# Patient Record
Sex: FEMALE | Race: WHITE | Employment: UNEMPLOYED | URBAN - NONMETROPOLITAN AREA
[De-identification: names, ages, dates, MRNs, and addresses within clinical notes are randomized per-mention and may not be internally consistent; named-entity substitution may affect disease eponyms.]

---

## 2019-02-22 ENCOUNTER — HOSPITAL ENCOUNTER (EMERGENCY)
Age: 24
Discharge: HOME | End: 2019-02-22
Attending: EMERGENCY MEDICINE
Payer: MEDICARE

## 2019-02-22 VITALS
HEIGHT: 69 IN | WEIGHT: 232 LBS | TEMPERATURE: 98.3 F | SYSTOLIC BLOOD PRESSURE: 148 MMHG | HEART RATE: 95 BPM | RESPIRATION RATE: 16 BRPM | DIASTOLIC BLOOD PRESSURE: 98 MMHG | BODY MASS INDEX: 34.36 KG/M2 | OXYGEN SATURATION: 99 %

## 2019-02-22 DIAGNOSIS — M79.604 RIGHT LEG PAIN: Primary | ICD-10-CM

## 2019-02-22 PROCEDURE — 99282 EMERGENCY DEPT VISIT SF MDM: CPT

## 2019-02-22 PROCEDURE — 96372 THER/PROPH/DIAG INJ SC/IM: CPT

## 2019-02-22 PROCEDURE — 6360000002 HC RX W HCPCS: Performed by: EMERGENCY MEDICINE

## 2019-02-22 RX ORDER — ORPHENADRINE CITRATE 30 MG/ML
60 INJECTION INTRAMUSCULAR; INTRAVENOUS ONCE
Status: COMPLETED | OUTPATIENT
Start: 2019-02-22 | End: 2019-02-22

## 2019-02-22 RX ORDER — CYCLOBENZAPRINE HCL 10 MG
10 TABLET ORAL 3 TIMES DAILY PRN
Qty: 15 TABLET | Refills: 0 | Status: SHIPPED | OUTPATIENT
Start: 2019-02-22 | End: 2019-02-27

## 2019-02-22 RX ORDER — METHYLPREDNISOLONE 4 MG/1
TABLET ORAL
Qty: 1 KIT | Refills: 0 | Status: SHIPPED | OUTPATIENT
Start: 2019-02-22 | End: 2019-02-28

## 2019-02-22 RX ORDER — KETOROLAC TROMETHAMINE 30 MG/ML
30 INJECTION, SOLUTION INTRAMUSCULAR; INTRAVENOUS ONCE
Status: COMPLETED | OUTPATIENT
Start: 2019-02-22 | End: 2019-02-22

## 2019-02-22 RX ORDER — TRAZODONE HYDROCHLORIDE 150 MG/1
150 TABLET ORAL NIGHTLY
COMMUNITY
End: 2019-06-11

## 2019-02-22 RX ORDER — HYDROXYZINE PAMOATE 25 MG/1
50 CAPSULE ORAL 3 TIMES DAILY PRN
COMMUNITY
End: 2019-05-24 | Stop reason: SDUPTHER

## 2019-02-22 RX ORDER — CARBAMAZEPINE 200 MG/1
200 TABLET ORAL DAILY
COMMUNITY
End: 2020-12-21

## 2019-02-22 RX ADMIN — KETOROLAC TROMETHAMINE 30 MG: 30 INJECTION, SOLUTION INTRAMUSCULAR at 21:27

## 2019-02-22 RX ADMIN — ORPHENADRINE CITRATE 60 MG: 30 INJECTION INTRAMUSCULAR; INTRAVENOUS at 21:27

## 2019-02-22 ASSESSMENT — PAIN DESCRIPTION - LOCATION: LOCATION: LEG

## 2019-02-22 ASSESSMENT — PAIN DESCRIPTION - ORIENTATION
ORIENTATION: RIGHT
ORIENTATION: RIGHT

## 2019-02-22 ASSESSMENT — PAIN DESCRIPTION - DESCRIPTORS: DESCRIPTORS: SHARP

## 2019-02-22 ASSESSMENT — PAIN SCALES - GENERAL
PAINLEVEL_OUTOF10: 3
PAINLEVEL_OUTOF10: 7

## 2019-02-22 ASSESSMENT — PAIN DESCRIPTION - PAIN TYPE
TYPE: ACUTE PAIN
TYPE: ACUTE PAIN

## 2019-02-23 ASSESSMENT — ENCOUNTER SYMPTOMS
VOMITING: 0
NAUSEA: 0
SHORTNESS OF BREATH: 0
BACK PAIN: 1

## 2019-02-25 ENCOUNTER — TELEPHONE (OUTPATIENT)
Dept: INTERNAL MEDICINE | Age: 24
End: 2019-02-25

## 2019-03-06 ENCOUNTER — HOSPITAL ENCOUNTER (EMERGENCY)
Age: 24
Discharge: LEFT BEFORE DISCHARGE | End: 2019-03-06
Payer: MEDICARE

## 2019-03-06 VITALS
HEART RATE: 103 BPM | HEIGHT: 69 IN | TEMPERATURE: 98.2 F | BODY MASS INDEX: 34.07 KG/M2 | OXYGEN SATURATION: 100 % | DIASTOLIC BLOOD PRESSURE: 76 MMHG | WEIGHT: 230 LBS | RESPIRATION RATE: 16 BRPM | SYSTOLIC BLOOD PRESSURE: 138 MMHG

## 2019-03-06 PROCEDURE — 4500000002 HC ER NO CHARGE

## 2019-03-15 ENCOUNTER — HOSPITAL ENCOUNTER (EMERGENCY)
Age: 24
Discharge: HOME | End: 2019-03-15
Attending: EMERGENCY MEDICINE
Payer: MEDICARE

## 2019-03-15 ENCOUNTER — APPOINTMENT (OUTPATIENT)
Dept: GENERAL RADIOLOGY | Age: 24
End: 2019-03-15
Payer: MEDICARE

## 2019-03-15 VITALS
TEMPERATURE: 99.9 F | RESPIRATION RATE: 16 BRPM | DIASTOLIC BLOOD PRESSURE: 91 MMHG | HEART RATE: 102 BPM | OXYGEN SATURATION: 96 % | SYSTOLIC BLOOD PRESSURE: 141 MMHG

## 2019-03-15 DIAGNOSIS — J40 BRONCHITIS: Primary | ICD-10-CM

## 2019-03-15 LAB
DIRECT EXAM: NORMAL
Lab: NORMAL
SPECIMEN DESCRIPTION: NORMAL

## 2019-03-15 PROCEDURE — 87804 INFLUENZA ASSAY W/OPTIC: CPT

## 2019-03-15 PROCEDURE — 71046 X-RAY EXAM CHEST 2 VIEWS: CPT

## 2019-03-15 PROCEDURE — 99283 EMERGENCY DEPT VISIT LOW MDM: CPT

## 2019-03-15 RX ORDER — BENZONATATE 200 MG/1
200 CAPSULE ORAL 3 TIMES DAILY PRN
Qty: 30 CAPSULE | Refills: 0 | Status: SHIPPED | OUTPATIENT
Start: 2019-03-15 | End: 2019-03-22

## 2019-03-15 RX ORDER — AZITHROMYCIN 250 MG/1
TABLET, FILM COATED ORAL
Qty: 1 PACKET | Refills: 0 | Status: SHIPPED | OUTPATIENT
Start: 2019-03-15 | End: 2019-03-15

## 2019-03-15 RX ORDER — IBUPROFEN 800 MG/1
800 TABLET ORAL EVERY 8 HOURS PRN
Qty: 30 TABLET | Refills: 0 | Status: SHIPPED | OUTPATIENT
Start: 2019-03-15 | End: 2019-05-12

## 2019-03-15 ASSESSMENT — ENCOUNTER SYMPTOMS
NAUSEA: 0
VOMITING: 0
COUGH: 1
EYE PAIN: 0
DIARRHEA: 0
BLOOD IN STOOL: 0
ABDOMINAL PAIN: 0
BACK PAIN: 0
SHORTNESS OF BREATH: 0
CONSTIPATION: 0

## 2019-03-18 ENCOUNTER — OFFICE VISIT (OUTPATIENT)
Dept: INTERNAL MEDICINE | Age: 24
End: 2019-03-18
Payer: MEDICARE

## 2019-03-18 VITALS
DIASTOLIC BLOOD PRESSURE: 80 MMHG | BODY MASS INDEX: 35.96 KG/M2 | HEIGHT: 69 IN | WEIGHT: 242.8 LBS | HEART RATE: 82 BPM | SYSTOLIC BLOOD PRESSURE: 130 MMHG | RESPIRATION RATE: 16 BRPM

## 2019-03-18 DIAGNOSIS — J40 BRONCHITIS: ICD-10-CM

## 2019-03-18 DIAGNOSIS — F60.3 BORDERLINE PERSONALITY DISORDER (HCC): ICD-10-CM

## 2019-03-18 DIAGNOSIS — R05.9 COUGH: ICD-10-CM

## 2019-03-18 DIAGNOSIS — F31.9 BIPOLAR DISEASE, CHRONIC (HCC): ICD-10-CM

## 2019-03-18 DIAGNOSIS — F41.0 PANIC DISORDER: Primary | ICD-10-CM

## 2019-03-18 DIAGNOSIS — F17.210 CIGARETTE SMOKER: ICD-10-CM

## 2019-03-18 DIAGNOSIS — F43.10 PTSD (POST-TRAUMATIC STRESS DISORDER): ICD-10-CM

## 2019-03-18 DIAGNOSIS — E66.9 CLASS 2 OBESITY WITHOUT SERIOUS COMORBIDITY WITH BODY MASS INDEX (BMI) OF 35.0 TO 35.9 IN ADULT, UNSPECIFIED OBESITY TYPE: ICD-10-CM

## 2019-03-18 DIAGNOSIS — Z72.0 TOBACCO ABUSE: ICD-10-CM

## 2019-03-18 PROBLEM — E66.812 CLASS 2 OBESITY WITHOUT SERIOUS COMORBIDITY WITH BODY MASS INDEX (BMI) OF 35.0 TO 35.9 IN ADULT: Status: ACTIVE | Noted: 2019-03-18

## 2019-03-18 PROCEDURE — G8417 CALC BMI ABV UP PARAM F/U: HCPCS | Performed by: INTERNAL MEDICINE

## 2019-03-18 PROCEDURE — 99204 OFFICE O/P NEW MOD 45 MIN: CPT | Performed by: INTERNAL MEDICINE

## 2019-03-18 PROCEDURE — 4004F PT TOBACCO SCREEN RCVD TLK: CPT | Performed by: INTERNAL MEDICINE

## 2019-03-18 PROCEDURE — G8484 FLU IMMUNIZE NO ADMIN: HCPCS | Performed by: INTERNAL MEDICINE

## 2019-03-18 PROCEDURE — G8427 DOCREV CUR MEDS BY ELIG CLIN: HCPCS | Performed by: INTERNAL MEDICINE

## 2019-03-18 RX ORDER — FLUOXETINE HYDROCHLORIDE 20 MG/1
CAPSULE ORAL
Refills: 0 | COMMUNITY
Start: 2019-03-05 | End: 2019-03-18 | Stop reason: CLARIF

## 2019-03-18 RX ORDER — FLUOXETINE HYDROCHLORIDE 20 MG/1
CAPSULE ORAL
COMMUNITY
End: 2020-12-21 | Stop reason: ALTCHOICE

## 2019-03-18 RX ORDER — FLUOXETINE 10 MG/1
30 CAPSULE ORAL
COMMUNITY
End: 2020-12-21

## 2019-03-18 RX ORDER — BUSPIRONE HYDROCHLORIDE 7.5 MG/1
TABLET ORAL
Refills: 0 | COMMUNITY
Start: 2019-03-05 | End: 2020-12-21

## 2019-03-18 RX ORDER — FLUOXETINE 10 MG/1
CAPSULE ORAL
Refills: 0 | COMMUNITY
Start: 2019-03-05 | End: 2019-03-18 | Stop reason: CLARIF

## 2019-03-18 RX ORDER — ALBUTEROL SULFATE 90 UG/1
1-2 AEROSOL, METERED RESPIRATORY (INHALATION) EVERY 6 HOURS PRN
Qty: 1 INHALER | Refills: 5 | Status: SHIPPED | OUTPATIENT
Start: 2019-03-18 | End: 2019-06-11

## 2019-05-12 ENCOUNTER — HOSPITAL ENCOUNTER (EMERGENCY)
Age: 24
Discharge: HOME | End: 2019-05-12
Attending: EMERGENCY MEDICINE
Payer: MEDICARE

## 2019-05-12 VITALS
TEMPERATURE: 98.4 F | OXYGEN SATURATION: 100 % | DIASTOLIC BLOOD PRESSURE: 88 MMHG | RESPIRATION RATE: 16 BRPM | SYSTOLIC BLOOD PRESSURE: 129 MMHG | HEART RATE: 97 BPM

## 2019-05-12 DIAGNOSIS — K08.89 PAIN, DENTAL: Primary | ICD-10-CM

## 2019-05-12 PROCEDURE — 99282 EMERGENCY DEPT VISIT SF MDM: CPT

## 2019-05-12 PROCEDURE — 6370000000 HC RX 637 (ALT 250 FOR IP): Performed by: EMERGENCY MEDICINE

## 2019-05-12 RX ORDER — PENICILLIN V POTASSIUM 250 MG/1
500 TABLET ORAL ONCE
Status: COMPLETED | OUTPATIENT
Start: 2019-05-12 | End: 2019-05-12

## 2019-05-12 RX ORDER — IBUPROFEN 800 MG/1
800 TABLET ORAL EVERY 8 HOURS PRN
Qty: 30 TABLET | Refills: 0 | Status: SHIPPED | OUTPATIENT
Start: 2019-05-12 | End: 2019-06-07

## 2019-05-12 RX ORDER — IBUPROFEN 800 MG/1
800 TABLET ORAL ONCE
Status: COMPLETED | OUTPATIENT
Start: 2019-05-12 | End: 2019-05-12

## 2019-05-12 RX ORDER — PENICILLIN V POTASSIUM 500 MG/1
500 TABLET ORAL 4 TIMES DAILY
Qty: 28 TABLET | Refills: 0 | Status: SHIPPED | OUTPATIENT
Start: 2019-05-12 | End: 2019-05-19

## 2019-05-12 RX ADMIN — PENICILLIN V POTASIUM 500 MG: 250 TABLET ORAL at 23:07

## 2019-05-12 RX ADMIN — IBUPROFEN 800 MG: 800 TABLET ORAL at 23:06

## 2019-05-12 ASSESSMENT — ENCOUNTER SYMPTOMS
ABDOMINAL PAIN: 0
RHINORRHEA: 0
VOMITING: 0
BACK PAIN: 0
EYE PAIN: 0
SHORTNESS OF BREATH: 0
COUGH: 0
NAUSEA: 0
DIARRHEA: 0
SORE THROAT: 0

## 2019-05-12 ASSESSMENT — PAIN DESCRIPTION - PROGRESSION: CLINICAL_PROGRESSION: GRADUALLY WORSENING

## 2019-05-12 ASSESSMENT — PAIN DESCRIPTION - PAIN TYPE: TYPE: ACUTE PAIN

## 2019-05-12 ASSESSMENT — PAIN DESCRIPTION - FREQUENCY: FREQUENCY: CONTINUOUS

## 2019-05-12 ASSESSMENT — PAIN DESCRIPTION - LOCATION: LOCATION: TEETH

## 2019-05-12 ASSESSMENT — PAIN DESCRIPTION - DESCRIPTORS: DESCRIPTORS: ACHING;SHARP;STABBING

## 2019-05-12 ASSESSMENT — PAIN SCALES - GENERAL: PAINLEVEL_OUTOF10: 8

## 2019-05-12 ASSESSMENT — PAIN DESCRIPTION - ONSET: ONSET: ON-GOING

## 2019-05-12 ASSESSMENT — PAIN DESCRIPTION - ORIENTATION: ORIENTATION: RIGHT;LOWER;POSTERIOR

## 2019-05-13 NOTE — ED PROVIDER NOTES
[facility] ED  [facility]  Phone: [phone]    eMERGENCY dEPARTMENT eNCOUnter          Pt Name: Isabella Guaman  MRN: [id number]  [place] 1995  Date of evaluation: 5/12/2019      CHIEF COMPLAINT       Chief Complaint   Patient presents with    Dental Pain     onset \"Fri night\"       HISTORY OF PRESENT ILLNESS              Jennifer Sanchez is a 21 y.o. female who presents with right lower canine dental pain. States she had or other T3 sling extracted however they were unable to finish anesthetizing the right lower teeth that need to be extracted and they were unable to extract teeth. She has an appointment to see the dentist again to try and extract the rest of the teeth. Denies any facial edema or erythema. No fevers or trouble swallowing. Denies other symptoms or concerns. REVIEW OF SYSTEMS         Review of Systems   Constitutional: Negative for chills, fatigue and fever. HENT: Positive for dental problem. Negative for congestion, rhinorrhea and sore throat. Eyes: Negative for pain. Respiratory: Negative for cough and shortness of breath. Cardiovascular: Negative for chest pain. Gastrointestinal: Negative for abdominal pain, diarrhea, nausea and vomiting. Genitourinary: Negative for difficulty urinating. Musculoskeletal: Negative for back pain and neck pain. Skin: Negative for rash. Neurological: Negative for weakness and headaches. All other systems reviewed and are negative. PAST MEDICAL HISTORY    has a past medical history of Anxiety, Bipolar disease, chronic ([address] Borderline personality disorder ([address] Bronchitis, Depression, Gallbladder disease, History of marijuana use, History of methamphetamine use, Panic disorder, PTSD (post-traumatic stress disorder), and Tobacco abuse. SURGICAL HISTORY      has a past surgical history that includes ERCP (2014) and Cholecystectomy, laparoscopic (2014).     CURRENT MEDICATIONS       Previous Medications    ALBUTEROL SULFATE HFA (VENTOLIN HFA) 108 (90 BASE) MCG/ACT INHALER    Inhale 1-2 puffs into the lungs every 6 hours as needed for Shortness of Breath    BUSPIRONE (BUSPAR) 7.5 MG TABLET    take 1 tablet by mouth three times a day for anxiety    CARBAMAZEPINE (TEGRETOL) 200 MG TABLET    Take 200 mg by mouth 2 times daily    FLUOXETINE (PROZAC) 10 MG CAPSULE    Take 1 capsule with 20 mg capsule daily for total dose of 30 mg daily    FLUOXETINE (PROZAC) 20 MG CAPSULE    Take 1 capsule with 10 mg capsule daily for total dose of 30 mg daily    HYDROXYZINE (VISTARIL) 25 MG CAPSULE    Take 50 mg by mouth 3 times daily as needed for Anxiety     TRAZODONE (DESYREL) 150 MG TABLET    Take 150 mg by mouth nightly        ALLERGIES     is allergic to codeine and tape [adhesive tape]. FAMILY HISTORY     indicated that her mother is alive. She indicated that her father is alive. She indicated that her brother is alive. She indicated that her maternal grandmother is alive. She indicated that her maternal grandfather is alive. She indicated that her paternal grandmother is alive. She indicated that her paternal grandfather is alive. family history includes Asthma in her brother; Heart Attack in her maternal grandfather; Heart Disease in her maternal grandfather; Mental Illness in her maternal grandmother and mother; Other in her brother; Seizures in her mother. SOCIAL HISTORY      reports that she has been smoking cigarettes. She has a 1.50 pack-year smoking history. She has never used smokeless tobacco. She reports that she has current or past drug history. Drugs: Marijuana and Methamphetamines. She reports that she does not drink alcohol. PHYSICAL EXAM     INITIAL VITALS:  tympanic temperature is 98.4 °F (36.9 °C). Her blood pressure is 129/88 and her pulse is 97. Her respiration is 16 and oxygen saturation is 100%.         Physical Exam   Constitutional: She appears by mouth every 8 hours as needed for Pain     Dispense:  30 tablet     Refill:  0    ibuprofen (ADVIL;MOTRIN) tablet 800 mg        Vitals:    Vitals:    05/12/19 2242   BP: 129/88   Pulse: 97   Resp: 16   Temp: 98.4 °F (36.9 °C)   TempSrc: Tympanic   SpO2: 100%     -------------------------  BP: 129/88, Temp: 98.4 °F (36.9 °C), Pulse: 97, Resp: 16      CONSULTS:    None    CRITICAL CARE:     None    PROCEDURES:    None    FINAL IMPRESSION      1.  Pain, dental          DISPOSITION/PLAN   DISPOSITION Decision To Discharge 05/12/2019 11:01:10 PM      Condition on Disposition    Improved    PATIENT REFERRED TO:  Brittanie Carcamo DO  [address] [facility]  [phone]    Schedule an appointment as soon as possible for a visit in 3 days        DISCHARGE MEDICATIONS:  New Prescriptions    IBUPROFEN (ADVIL;MOTRIN) 800 MG TABLET    Take 1 tablet by mouth every 8 hours as needed for Pain    PENICILLIN V POTASSIUM (VEETID) 500 MG TABLET    Take 1 tablet by mouth 4 times daily for 7 days       (Please note that portions of this note were completed with a voice recognition program.  Efforts were made to edit the dictations but occasionally words are mis-transcribed.)    Hillary Hernandez DO  Attending Emergency Physician        Hillary Hernandez DO  05/12/19 0892

## 2019-05-24 ENCOUNTER — TELEPHONE (OUTPATIENT)
Dept: INTERNAL MEDICINE | Age: 24
End: 2019-05-24

## 2019-05-24 ENCOUNTER — OFFICE VISIT (OUTPATIENT)
Dept: INTERNAL MEDICINE | Age: 24
End: 2019-05-24
Payer: MEDICARE

## 2019-05-24 VITALS
HEART RATE: 78 BPM | DIASTOLIC BLOOD PRESSURE: 68 MMHG | SYSTOLIC BLOOD PRESSURE: 106 MMHG | BODY MASS INDEX: 34.36 KG/M2 | WEIGHT: 232 LBS | HEIGHT: 69 IN

## 2019-05-24 DIAGNOSIS — F41.0 PANIC DISORDER: Primary | ICD-10-CM

## 2019-05-24 DIAGNOSIS — F43.10 PTSD (POST-TRAUMATIC STRESS DISORDER): ICD-10-CM

## 2019-05-24 PROCEDURE — 99213 OFFICE O/P EST LOW 20 MIN: CPT | Performed by: INTERNAL MEDICINE

## 2019-05-24 PROCEDURE — G8427 DOCREV CUR MEDS BY ELIG CLIN: HCPCS | Performed by: INTERNAL MEDICINE

## 2019-05-24 PROCEDURE — 4004F PT TOBACCO SCREEN RCVD TLK: CPT | Performed by: INTERNAL MEDICINE

## 2019-05-24 PROCEDURE — G8417 CALC BMI ABV UP PARAM F/U: HCPCS | Performed by: INTERNAL MEDICINE

## 2019-05-24 RX ORDER — ALPRAZOLAM 0.5 MG/1
0.5 TABLET ORAL 3 TIMES DAILY PRN
Qty: 45 TABLET | Refills: 0 | Status: SHIPPED | OUTPATIENT
Start: 2019-05-24 | End: 2019-06-03 | Stop reason: DRUGHIGH

## 2019-05-24 RX ORDER — LORAZEPAM 0.5 MG/1
TABLET ORAL
Refills: 0 | COMMUNITY
Start: 2019-04-17 | End: 2019-06-11

## 2019-05-24 RX ORDER — HYDROXYZINE 50 MG/1
TABLET, FILM COATED ORAL
Refills: 0 | COMMUNITY
Start: 2019-04-17 | End: 2019-06-11

## 2019-05-24 NOTE — TELEPHONE ENCOUNTER
Patient called [facility] and they ckosed her case d/t no showing appointment. She has to get set back up with them. They are going to call her to set appointment and in the mean time she was wondering if you could prescribe the Alprazolam. Just until she see's them.

## 2019-05-24 NOTE — PROGRESS NOTES
DR. Chaves Mood - PROGRESS NOTE    CHIEF COMPLAINT/HISTORY OF CHIEF COMPLAINT: This 21 y.o.  female comes in today to see how her anxiety is doing since she has been on her new doses of her psychiatric medicines for a couple of months. She feels that the Vistaril is not working. She says it seems to make her anxiety worse. She also says that she has been having issues with her anxiety at work. When she gets anxious she needs to have a few minutes to calm down and they are not letting her do that. She feels that if she doesn't get a five minute break the anxiety will get worse to the point where she will pass out. It usually happens around the change of shift from first to second shift. She does have another appointment at [facility] coming up in the next couple of weeks. She says they have told her that they have done \"all that they can do\" from a medication standpoint to help her anxiety, short of using controlled substances, which is not something that they usually use. She was told that the Vistaril was \"a last resort. \"    Medications that she hasn't tried yet include: Paxil, Luvox    Medications that she has been on in the past have been the following: Xanax, Effexor, Klonopin, Seroquel, Ativan    She is beginning to potentially consider taking the Xanax again.       ALLERGIES/INTOLERANCES:   Allergies   Allergen Reactions    Codeine Anaphylaxis    Tape Janece Rahel Tape] Other (See Comments)     Paper tape causes blisters       MEDICATIONS:   Outpatient Medications Marked as Taking for the 5/24/19 encounter (Office Visit) with Wagner Osorio, DO   Medication Sig Dispense Refill    hydrOXYzine (ATARAX) 50 MG tablet take 1 tablet by mouth three times a day if needed for anxiety  0    LORazepam (ATIVAN) 0.5 MG tablet take 1 tablet by mouth twice a day if needed  0    ibuprofen (ADVIL;MOTRIN) 800 MG tablet Take 1 tablet by mouth every 8 hours as needed for Pain 30 tablet 0    busPIRone (BUSPAR) 7.5 MG tablet take 1 tablet by mouth three times a day for anxiety  0    FLUoxetine (PROZAC) 10 MG capsule Take 1 capsule with 20 mg capsule daily for total dose of 30 mg daily      FLUoxetine (PROZAC) 20 MG capsule Take 1 capsule with 10 mg capsule daily for total dose of 30 mg daily      albuterol sulfate HFA (VENTOLIN HFA) 108 (90 Base) MCG/ACT inhaler Inhale 1-2 puffs into the lungs every 6 hours as needed for Shortness of Breath 1 Inhaler 5    carBAMazepine (TEGRETOL) 200 MG tablet Take 200 mg by mouth 2 times daily      traZODone (DESYREL) 150 MG tablet Take 150 mg by mouth nightly       hydrOXYzine (VISTARIL) 25 MG capsule Take 50 mg by mouth 3 times daily as needed for Anxiety          IMMUNIZATIONS: Reviewed for influenza and pneumococcal status as indicated in electronic record. REVIEW OF SYSTEMS:     Please see history of chief complaint above; otherwise no new problems with respect to General, HEENT, Cardiovascular, Respiratory, Gastrointestinal, Genitourinary, Endocrinologic, Musculoskeletal, or Neuropsychiatric complaints. PHYSICAL EXAMINATION:    Wt Readings from Last 2 Encounters:   05/24/19 232 lb (105.2 kg)   03/18/19 242 lb 12.8 oz (110.1 kg)       Vitals:    05/24/19 1110   BP: 106/68   Site: Left Upper Arm   Position: Sitting   Cuff Size: Large Adult   Pulse: 78   Weight: 232 lb (105.2 kg)   Height: 5' 9.02\" (1.753 m)     Body mass index is 34.24 kg/m². General: This is a 21 y.o.  female who is alert and oriented to person, place and time. She appears to be her stated age and does not appear to be in any acute distress. Skin: Skin color, texture, turgor normal. No rashes or lesions. HEENT/Neck: Head: Normal, normocephalic, atraumatic. Eye: Normal external eye, conjunctiva, lids cornea, NINA. Ears: Normal TM's bilaterally. Normal auditory canals and external ears. Non-tender. Nose: Normal external nose, mucus membranes and septum.   Pharynx: Dental Hygiene adequate. Normal buccal mucosa. Normal pharynx. Neck / Thyroid: Supple, no masses, nodes, nodules or enlargement. Lungs: Normal - CTA without rales, rhonchi, or wheezing. Heart: regular rate and rhythm, S1, S2 normal, no murmur, click, rub or gallop No S3 or S4. Abdomen: Obese, soft, non-distended, non-tender, normal active bowel sounds, no masses palpated and no hepatosplenomegaly  Extremities: There is no clubbing, cyanosis, or edema in any of the extremities. Neurologic:  cranial nerves II-XII are grossly intact  Osteopathic Structural Examination: Patient was examined in the seated and standing positions. There was full range of motion in the cervical, thoracic, and lumbar spines. No scoliosis. No change in thoracic kyphosis or lumbar lordosis. No increased paravertebral muscle tenderness. ASSESSMENT/PLAN:    1. Panic disorder, stable  2. PTSD (post-traumatic stress disorder), stable  - She will be seeing her providers at [facility] again in a couple of weeks, and will discuss stopping the Vistaril at that time. - We will consider prescribing Xanax for her if the providers at [facility] do not. No orders of the defined types were placed in this encounter. Requested Prescriptions      No prescriptions requested or ordered in this encounter       She seems to be doing fairly well with regards to her chronic health problems so we are not going to make any changes to her medications. Return in about 1 month (around 6/24/2019).         Electronically signed by Jamila Lilly DO on 5/24/2019 at 10:20 PM  Internal Medicine

## 2019-06-01 ENCOUNTER — HOSPITAL ENCOUNTER (EMERGENCY)
Age: 24
Discharge: HOME | End: 2019-06-01
Attending: EMERGENCY MEDICINE
Payer: MEDICARE

## 2019-06-01 VITALS
RESPIRATION RATE: 22 BRPM | OXYGEN SATURATION: 98 % | DIASTOLIC BLOOD PRESSURE: 99 MMHG | HEART RATE: 109 BPM | BODY MASS INDEX: 32.58 KG/M2 | TEMPERATURE: 100.9 F | SYSTOLIC BLOOD PRESSURE: 151 MMHG | HEIGHT: 69 IN | WEIGHT: 220 LBS

## 2019-06-01 DIAGNOSIS — F41.9 ANXIETY: Primary | ICD-10-CM

## 2019-06-01 LAB
ABSOLUTE EOS #: 0.1 K/UL (ref 0–0.4)
ABSOLUTE IMMATURE GRANULOCYTE: NORMAL K/UL (ref 0–0.3)
ABSOLUTE LYMPH #: 2.1 K/UL (ref 1–4.8)
ABSOLUTE MONO #: 0.6 K/UL (ref 0.1–1.2)
ANION GAP SERPL CALCULATED.3IONS-SCNC: 15 MMOL/L (ref 9–17)
BASOPHILS # BLD: 0 % (ref 0–1)
BASOPHILS ABSOLUTE: 0 K/UL (ref 0–0.2)
BUN BLDV-MCNC: 14 MG/DL (ref 6–20)
BUN/CREAT BLD: 24 (ref 9–20)
CALCIUM SERPL-MCNC: 9.7 MG/DL (ref 8.6–10.4)
CHLORIDE BLD-SCNC: 100 MMOL/L (ref 98–107)
CO2: 22 MMOL/L (ref 20–31)
CREAT SERPL-MCNC: 0.59 MG/DL (ref 0.5–0.9)
D DIMER: <100 NG/ML
DIFFERENTIAL TYPE: NORMAL
EOSINOPHILS RELATIVE PERCENT: 1 % (ref 1–7)
GFR AFRICAN AMERICAN: >60 ML/MIN
GFR NON-AFRICAN AMERICAN: >60 ML/MIN
GFR SERPL CREATININE-BSD FRML MDRD: ABNORMAL ML/MIN/{1.73_M2}
GFR SERPL CREATININE-BSD FRML MDRD: ABNORMAL ML/MIN/{1.73_M2}
GLUCOSE BLD-MCNC: 130 MG/DL (ref 70–99)
HCG QUALITATIVE: NEGATIVE
HCT VFR BLD CALC: 38 % (ref 36–46)
HEMOGLOBIN: 12.8 G/DL (ref 12–16)
IMMATURE GRANULOCYTES: NORMAL %
LYMPHOCYTES # BLD: 22 % (ref 16–46)
MCH RBC QN AUTO: 31.2 PG (ref 26–34)
MCHC RBC AUTO-ENTMCNC: 33.8 G/DL (ref 31–37)
MCV RBC AUTO: 92.3 FL (ref 80–100)
MONOCYTES # BLD: 7 % (ref 4–11)
NRBC AUTOMATED: NORMAL PER 100 WBC
PDW BLD-RTO: 13.2 % (ref 11–14.5)
PLATELET # BLD: 302 K/UL (ref 140–450)
PLATELET ESTIMATE: NORMAL
PMV BLD AUTO: 9.3 FL (ref 6–12)
POTASSIUM SERPL-SCNC: 3.4 MMOL/L (ref 3.7–5.3)
RBC # BLD: 4.12 M/UL (ref 4–5.2)
RBC # BLD: NORMAL 10*6/UL
SEG NEUTROPHILS: 70 % (ref 43–77)
SEGMENTED NEUTROPHILS ABSOLUTE COUNT: 6.5 K/UL (ref 1.8–7.7)
SODIUM BLD-SCNC: 137 MMOL/L (ref 135–144)
WBC # BLD: 9.4 K/UL (ref 3.5–11)
WBC # BLD: NORMAL 10*3/UL

## 2019-06-01 PROCEDURE — 85025 COMPLETE CBC W/AUTO DIFF WBC: CPT

## 2019-06-01 PROCEDURE — 85379 FIBRIN DEGRADATION QUANT: CPT

## 2019-06-01 PROCEDURE — 96374 THER/PROPH/DIAG INJ IV PUSH: CPT

## 2019-06-01 PROCEDURE — 93005 ELECTROCARDIOGRAM TRACING: CPT | Performed by: EMERGENCY MEDICINE

## 2019-06-01 PROCEDURE — 80048 BASIC METABOLIC PNL TOTAL CA: CPT

## 2019-06-01 PROCEDURE — 99283 EMERGENCY DEPT VISIT LOW MDM: CPT

## 2019-06-01 PROCEDURE — 2580000003 HC RX 258: Performed by: EMERGENCY MEDICINE

## 2019-06-01 PROCEDURE — 6360000002 HC RX W HCPCS: Performed by: EMERGENCY MEDICINE

## 2019-06-01 PROCEDURE — 84703 CHORIONIC GONADOTROPIN ASSAY: CPT

## 2019-06-01 RX ORDER — 0.9 % SODIUM CHLORIDE 0.9 %
1000 INTRAVENOUS SOLUTION INTRAVENOUS ONCE
Status: COMPLETED | OUTPATIENT
Start: 2019-06-01 | End: 2019-06-01

## 2019-06-01 RX ORDER — LORAZEPAM 2 MG/ML
0.5 INJECTION INTRAMUSCULAR ONCE
Status: COMPLETED | OUTPATIENT
Start: 2019-06-01 | End: 2019-06-01

## 2019-06-01 RX ADMIN — SODIUM CHLORIDE 1000 ML: 9 INJECTION, SOLUTION INTRAVENOUS at 15:52

## 2019-06-01 RX ADMIN — LORAZEPAM 0.5 MG: 2 INJECTION INTRAMUSCULAR; INTRAVENOUS at 15:53

## 2019-06-01 ASSESSMENT — PAIN SCALES - GENERAL: PAINLEVEL_OUTOF10: 5

## 2019-06-01 NOTE — ED PROVIDER NOTES
[facility] ED  [address] [facility]  Phone: [phone]  eMERGENCY dEPARTMENT eNCOUnter      Pt Name: Erik Randall  MRN: [id number]  [place] 1995  Date of evaluation: 6/1/2019    CHIEF COMPLAINT       Chief Complaint   Patient presents with    Panic Attack       HISTORY OF PRESENT ILLNESS    Jennifer Driscoll is a 21 y.o. female who presents to the emergency department with anxiety. She is feeling short of breath with chest pain that started earlier today. Typical she is able to walk away from situations but she is under tremendous amount of stress currently and cannot seem to get her symptoms under control today. Her anxiety has increased markedly since she stopped using drugs and alcohol back in February. She's been using his Xanax with minimal relief had a dose earlier today. Denies any other symptoms with the exception of shortness of breath. She rates her pain 5/10 and nothing really makes it better or worse. REVIEW OF SYSTEMS       Constitutional: No fevers or chills   HEENT: No sore throat, rhinorrhea, or earache   Eyes: No blurry vision or double vision no drainage   Cardiovascular: Positive chest pain and tachycardia  Respiratory: No wheezing positive shortness of breath  Gastrointestinal: No nausea, vomiting, diarrhea, constipation, or abdominal pain   : No hematuria or dysuria   Musculoskeletal: No swelling or pain   Skin: No rash   Neurological: No focal neurologic complaints, paresthesias, weakness, or headache     PAST MEDICAL HISTORY    has a past medical history of Anxiety, Bipolar disease, chronic ([address] Borderline personality disorder ([address] Bronchitis, Depression, Gallbladder disease, History of marijuana use, History of methamphetamine use, Panic disorder, PTSD (post-traumatic stress disorder), and Tobacco abuse. SURGICAL HISTORY      has a past surgical history that includes ERCP (2014) and Cholecystectomy, laparoscopic (2014).     CURRENT MEDICATIONS       Previous Medications    ALBUTEROL SULFATE HFA (VENTOLIN HFA) 108 (90 BASE) MCG/ACT INHALER    Inhale 1-2 puffs into the lungs every 6 hours as needed for Shortness of Breath    ALPRAZOLAM (XANAX) 0.5 MG TABLET    Take 1 tablet by mouth 3 times daily as needed for Anxiety for up to 15 days. BUSPIRONE (BUSPAR) 7.5 MG TABLET    take 1 tablet by mouth three times a day for anxiety    CARBAMAZEPINE (TEGRETOL) 200 MG TABLET    Take 200 mg by mouth daily     FLUOXETINE (PROZAC) 10 MG CAPSULE    30 mg Take 1 capsule with 20 mg capsule daily for total dose of 30 mg daily     FLUOXETINE (PROZAC) 20 MG CAPSULE    Take 1 capsule with 10 mg capsule daily for total dose of 30 mg daily    HYDROXYZINE (ATARAX) 50 MG TABLET    take 1 tablet by mouth three times a day if needed for anxiety    IBUPROFEN (ADVIL;MOTRIN) 800 MG TABLET    Take 1 tablet by mouth every 8 hours as needed for Pain    LORAZEPAM (ATIVAN) 0.5 MG TABLET    take 1 tablet by mouth twice a day if needed    TRAZODONE (DESYREL) 150 MG TABLET    Take 150 mg by mouth nightly        ALLERGIES     is allergic to codeine and tape [adhesive tape]. FAMILY HISTORY     indicated that her mother is alive. She indicated that her father is alive. She indicated that her brother is alive. She indicated that her maternal grandmother is alive. She indicated that her maternal grandfather is alive. She indicated that her paternal grandmother is alive. She indicated that her paternal grandfather is alive. family history includes Asthma in her brother; Heart Attack in her maternal grandfather; Heart Disease in her maternal grandfather; Mental Illness in her maternal grandmother and mother; Other in her brother; Seizures in her mother. SOCIAL HISTORY      reports that she has been smoking cigarettes. She has a 1.50 pack-year smoking history. She has never used smokeless tobacco. She reports that she has current or past drug history. Drug: Marijuana.  She reports that she does not drink alcohol. PHYSICAL EXAM       ED Triage Vitals [06/01/19 1529]   BP Temp Temp Source Pulse Resp SpO2 Height Weight   (!) 151/99 100.9 °F (38.3 °C) Tympanic 109 22 98 % 5' 9\" (1.753 m) 220 lb (99.8 kg)       Constitutional: Alert, oriented x3, nontoxic, answering questions appropriately, acting properly for age, in no acute distress afebrile rocking back and forth and anxious  HEENT: Extraocular muscles intact, mucus membranes moist,  Pupils equal, round, reactive to light,   Neck: Trachea midline   Cardiovascular: Regular rhythm and tachycardic no S3, S4, or murmurs   Respiratory: Clear to auscultation bilaterally no wheezes, rhonchi, rales, no respiratory distress no tachypnea no retractions no hypoxia  Gastrointestinal: Soft, nontender, nondistended, positive bowel sounds. No rebound, rigidity, or guarding. Musculoskeletal: No extremity pain or swelling tenderness  Neurologic: Moving all 4 extremities without difficulty there are no gross focal neurologic deficits   Skin: Warm and dry     Physical Exam  DIFFERENTIAL DIAGNOSIS/ MDM:     IV, labs. EKG. Ativan. DIAGNOSTIC RESULTS     EKG: All EKG's are interpreted by theEmergency Department Physician who either signs or Co-signs this chart in the absence of a cardiologist.    1525 sinus tachycardia rate 109 TX QRS 92  no acute ST-T wave changes.     Not indicated unless otherwise documented above    LABS:  Results for orders placed or performed during the hospital encounter of 06/01/19   CBC Auto Differential   Result Value Ref Range    WBC 9.4 3.5 - 11.0 k/uL    RBC 4.12 4.0 - 5.2 m/uL    Hemoglobin 12.8 12.0 - 16.0 g/dL    Hematocrit 38.0 36 - 46 %    MCV 92.3 80 - 100 fL    MCH 31.2 26 - 34 pg    MCHC 33.8 31 - 37 g/dL    RDW 13.2 11.0 - 14.5 %    Platelets 340 300 - 052 k/uL    MPV 9.3 6.0 - 12.0 fL    NRBC Automated NOT REPORTED per 100 WBC    Differential Type NOT REPORTED     Immature Granulocytes NOT REPORTED 0 % Absolute Immature Granulocyte NOT REPORTED 0.00 - 0.30 k/uL    WBC Morphology NOT REPORTED     RBC Morphology NOT REPORTED     Platelet Estimate NOT REPORTED     Seg Neutrophils 70 43 - 77 %    Lymphocytes 22 16 - 46 %    Monocytes 7 4 - 11 %    Eosinophils % 1 1 - 7 %    Basophils 0 0 - 1 %    Segs Absolute 6.50 1.8 - 7.7 k/uL    Absolute Lymph # 2.10 1.0 - 4.8 k/uL    Absolute Mono # 0.60 0.1 - 1.2 k/uL    Absolute Eos # 0.10 0.0 - 0.4 k/uL    Basophils # 0.00 0.0 - 0.2 k/uL   Basic Metabolic Panel   Result Value Ref Range    Glucose 130 (H) 70 - 99 mg/dL    BUN 14 6 - 20 mg/dL    CREATININE 0.59 0.50 - 0.90 mg/dL    Bun/Cre Ratio 24 (H) 9 - 20    Calcium 9.7 8.6 - 10.4 mg/dL    Sodium 137 135 - 144 mmol/L    Potassium 3.4 (L) 3.7 - 5.3 mmol/L    Chloride 100 98 - 107 mmol/L    CO2 22 20 - 31 mmol/L    Anion Gap 15 9 - 17 mmol/L    GFR Non-African American >60 >60 mL/min    GFR African American >60 >60 mL/min    GFR Comment          GFR Staging NOT REPORTED    HCG Qualitative, Serum   Result Value Ref Range    hCG Qual NEGATIVE NEGATIVE   D-Dimer, Rapid   Result Value Ref Range    D-Dimer, Quant <100 <400 ng/mL   EKG 12 Lead   Result Value Ref Range    Ventricular Rate 109 BPM    Atrial Rate 109 BPM    P-R Interval 138 ms    QRS Duration 92 ms    Q-T Interval 358 ms    QTc Calculation (Bazett) 482 ms    P Axis 47 degrees    R Axis 62 degrees    T Axis 49 degrees       Not indicated unless otherwise documented above    RADIOLOGY:   I reviewed the radiologist interpretations:    No orders to display       Not indicated unless otherwise documented above    EMERGENCY DEPARTMENT COURSE:     The patient was given the following medications:  Orders Placed This Encounter   Medications    LORazepam (ATIVAN) injection 0.5 mg    0.9 % sodium chloride bolus        Vitals:   -------------------------  BP (!) 151/99   Pulse 109   Temp 100.9 °F (38.3 °C) (Tympanic)   Resp 22   Ht 5' 9\" (1.753 m)   Wt 220 lb (99.8 kg) LMP 05/27/2019   SpO2 98%   BMI 32.49 kg/m²     4:15 PM awaiting d-dimer other labs unremarkable EKG also negative with the exception of tachycardia. She did present febrile no obvious source of her fever. Lungs clear. No upper respiratory symptoms. Chest heaviness resolved after IV Ativan. She does have by mouth Ativan at home. 4:45 PM no acute distress feeling much better. D-dimer negative. Will discharge. Anxiety reaction. I have reviewed the disposition diagnosis with the patient and or their family/guardian. I have answered their questions and given discharge instructions. They voiced understanding of these instructions and did not have any furtherquestions or complaints. CRITICAL CARE:    None    CONSULTS:    None    PROCEDURES:    None      OARRS Report if indicated             FINAL IMPRESSION      1.  Anxiety          DISPOSITION/PLAN   DISPOSITION Decision To Discharge 06/01/2019 04:45:45 PM        PATIENT REFERRED TO:  Sari Arvizu DO  [address] [place] [address] [address] Cumminsn  [phone]    Schedule an appointment as soon as possible for a visit in 2 days        DISCHARGE MEDICATIONS:  New Prescriptions    No medications on file       (Please note that portions of thisnote were completed with a voice recognition program.  Efforts were made to edit the dictations but occasionally words are mis-transcribed.)    Angel DO  Attending Emergency Physician        Yecenia Leach, [address] [place] [address]  06/01/19 3400

## 2019-06-02 LAB
EKG ATRIAL RATE: 109 BPM
EKG P AXIS: 47 DEGREES
EKG P-R INTERVAL: 138 MS
EKG Q-T INTERVAL: 358 MS
EKG QRS DURATION: 92 MS
EKG QTC CALCULATION (BAZETT): 482 MS
EKG R AXIS: 62 DEGREES
EKG T AXIS: 49 DEGREES
EKG VENTRICULAR RATE: 109 BPM

## 2019-06-03 ENCOUNTER — OFFICE VISIT (OUTPATIENT)
Dept: INTERNAL MEDICINE | Age: 24
End: 2019-06-03
Payer: MEDICARE

## 2019-06-03 VITALS
RESPIRATION RATE: 16 BRPM | HEART RATE: 80 BPM | DIASTOLIC BLOOD PRESSURE: 80 MMHG | SYSTOLIC BLOOD PRESSURE: 130 MMHG | HEIGHT: 69 IN | BODY MASS INDEX: 33.77 KG/M2 | WEIGHT: 228 LBS

## 2019-06-03 DIAGNOSIS — F43.10 PTSD (POST-TRAUMATIC STRESS DISORDER): ICD-10-CM

## 2019-06-03 DIAGNOSIS — F41.0 PANIC DISORDER: Primary | ICD-10-CM

## 2019-06-03 PROCEDURE — G8417 CALC BMI ABV UP PARAM F/U: HCPCS | Performed by: INTERNAL MEDICINE

## 2019-06-03 PROCEDURE — 4004F PT TOBACCO SCREEN RCVD TLK: CPT | Performed by: INTERNAL MEDICINE

## 2019-06-03 PROCEDURE — G8427 DOCREV CUR MEDS BY ELIG CLIN: HCPCS | Performed by: INTERNAL MEDICINE

## 2019-06-03 PROCEDURE — 99213 OFFICE O/P EST LOW 20 MIN: CPT | Performed by: INTERNAL MEDICINE

## 2019-06-03 RX ORDER — ALPRAZOLAM 1 MG/1
1 TABLET ORAL 3 TIMES DAILY PRN
Qty: 45 TABLET | Refills: 0 | Status: SHIPPED | OUTPATIENT
Start: 2019-06-03 | End: 2019-06-14 | Stop reason: SDUPTHER

## 2019-06-03 NOTE — PROGRESS NOTES
DR. Greene Channel - PROGRESS NOTE    CHIEF COMPLAINT/HISTORY OF CHIEF COMPLAINT: This 21 y.o.  female comes in today after having been seen in the emergency room at [facility] over the weekend for a panic attack. Her mother and sister have been away from home the last few days and so she has been at home by herself and it has been making her anxiety a lot worse. She has been using her Xanax a little more than prescribed because of it. She had a panic attack at work again on 6/1/19 and ended up going to the ER. They gave her some IV Ativan and it helped calm her down. She does have an upcoming appointment with [facility] next week but she is also wondering if her Xanax dose could be increased, because 0.5 mg works but not very well and she used to be on a much higher dose when she lived in [place]. There are no other complaints. ALLERGIES/INTOLERANCES:   Allergies   Allergen Reactions    Codeine Anaphylaxis    Tape Jimenez Bear Tape] Other (See Comments)     Paper tape causes blisters       MEDICATIONS:   Outpatient Medications Marked as Taking for the 6/3/19 encounter (Office Visit) with Mike Rosas, DO   Medication Sig Dispense Refill    ALPRAZolam (XANAX) 1 MG tablet Take 1 tablet by mouth 3 times daily as needed for Anxiety for up to 15 days.  45 tablet 0    hydrOXYzine (ATARAX) 50 MG tablet take 1 tablet by mouth three times a day if needed for anxiety  0    LORazepam (ATIVAN) 0.5 MG tablet take 1 tablet by mouth twice a day if needed  0    ibuprofen (ADVIL;MOTRIN) 800 MG tablet Take 1 tablet by mouth every 8 hours as needed for Pain 30 tablet 0    busPIRone (BUSPAR) 7.5 MG tablet take 1 tablet by mouth three times a day for anxiety  0    FLUoxetine (PROZAC) 10 MG capsule 30 mg Take 1 capsule with 20 mg capsule daily for total dose of 30 mg daily       FLUoxetine (PROZAC) 20 MG capsule Take 1 capsule with 10 mg capsule daily for total dose of 30 mg daily      albuterol sulfate HFA (VENTOLIN HFA) 108 (90 Base) MCG/ACT inhaler Inhale 1-2 puffs into the lungs every 6 hours as needed for Shortness of Breath 1 Inhaler 5    carBAMazepine (TEGRETOL) 200 MG tablet Take 200 mg by mouth daily       traZODone (DESYREL) 150 MG tablet Take 150 mg by mouth nightly          IMMUNIZATIONS: Reviewed for influenza and pneumococcal status as indicated in electronic record. REVIEW OF SYSTEMS:     Please see history of chief complaint above; otherwise no new problems with respect to General, HEENT, Cardiovascular, Respiratory, Gastrointestinal, Genitourinary, Endocrinologic, Musculoskeletal, or Neuropsychiatric complaints. PHYSICAL EXAMINATION:    Wt Readings from Last 2 Encounters:   06/03/19 228 lb (103.4 kg)   06/01/19 220 lb (99.8 kg)       Vitals:    06/03/19 1638   BP: 130/80   Site: Right Upper Arm   Position: Sitting   Cuff Size: Large Adult   Pulse: 80   Resp: 16   Weight: 228 lb (103.4 kg)   Height: 5' 9.02\" (1.753 m)     Body mass index is 33.65 kg/m². General: This is a 21 y.o.  female who is alert and oriented to person, place and time. She appears to be her stated age and does not appear to be in any acute distress. Skin: Skin color, texture, turgor normal. No rashes or lesions. HEENT/Neck: Head: Normal, normocephalic, atraumatic. Eye: Normal external eye, conjunctiva, lids cornea, NINA. Ears: Normal TM's bilaterally. Normal auditory canals and external ears. Non-tender. Nose: Normal external nose, mucus membranes and septum. Pharynx: Dental Hygiene adequate. Normal buccal mucosa. Normal pharynx. Neck / Thyroid: Supple, no masses, nodes, nodules or enlargement. Lungs: Normal - CTA without rales, rhonchi, or wheezing. Heart: regular rate and rhythm, S1, S2 normal, no murmur, click, rub or gallop No S3 or S4.   Abdomen: Obese, soft, non-distended, non-tender, normal active bowel sounds, no masses palpated and no hepatosplenomegaly  Extremities: There is no clubbing, cyanosis, or edema in any of the extremities. Neurologic:  cranial nerves II-XII are grossly intact    ASSESSMENT/PLAN:    1. Panic disorder, s/p panic attack treated in emergency room  2. PTSD (post-traumatic stress disorder)  - She does have an upcoming appointment at [facility] and will keep it as scheduled  - We will increase her Xanax to 1 mg three times a day as needed. - ALPRAZolam (XANAX) 1 MG tablet; Take 1 tablet by mouth 3 times daily as needed for Anxiety for up to 15 days. Dispense: 45 tablet; Refill: 0      No orders of the defined types were placed in this encounter. Requested Prescriptions     Signed Prescriptions Disp Refills    ALPRAZolam (XANAX) 1 MG tablet 45 tablet 0     Sig: Take 1 tablet by mouth 3 times daily as needed for Anxiety for up to 15 days. She will return here as previously scheduled or sooner if needed.         Electronically signed by Vandana Peguero DO on 6/3/2019 at 6:37 PM  Internal Medicine

## 2019-06-07 ENCOUNTER — APPOINTMENT (OUTPATIENT)
Dept: GENERAL RADIOLOGY | Age: 24
End: 2019-06-07
Payer: MEDICARE

## 2019-06-07 ENCOUNTER — HOSPITAL ENCOUNTER (EMERGENCY)
Age: 24
Discharge: HOME | End: 2019-06-07
Attending: EMERGENCY MEDICINE
Payer: MEDICARE

## 2019-06-07 VITALS
RESPIRATION RATE: 16 BRPM | SYSTOLIC BLOOD PRESSURE: 138 MMHG | HEIGHT: 69 IN | OXYGEN SATURATION: 97 % | BODY MASS INDEX: 32.58 KG/M2 | HEART RATE: 93 BPM | DIASTOLIC BLOOD PRESSURE: 81 MMHG | WEIGHT: 220 LBS | TEMPERATURE: 98.6 F

## 2019-06-07 DIAGNOSIS — S86.912A STRAIN OF LEFT KNEE, INITIAL ENCOUNTER: Primary | ICD-10-CM

## 2019-06-07 DIAGNOSIS — S93.402A SPRAIN OF LEFT ANKLE, UNSPECIFIED LIGAMENT, INITIAL ENCOUNTER: ICD-10-CM

## 2019-06-07 PROCEDURE — 73562 X-RAY EXAM OF KNEE 3: CPT

## 2019-06-07 PROCEDURE — 6360000002 HC RX W HCPCS

## 2019-06-07 PROCEDURE — 96372 THER/PROPH/DIAG INJ SC/IM: CPT

## 2019-06-07 PROCEDURE — 6370000000 HC RX 637 (ALT 250 FOR IP)

## 2019-06-07 PROCEDURE — 73590 X-RAY EXAM OF LOWER LEG: CPT

## 2019-06-07 PROCEDURE — 99283 EMERGENCY DEPT VISIT LOW MDM: CPT

## 2019-06-07 RX ORDER — KETOROLAC TROMETHAMINE 30 MG/ML
INJECTION, SOLUTION INTRAMUSCULAR; INTRAVENOUS
Status: COMPLETED
Start: 2019-06-07 | End: 2019-06-07

## 2019-06-07 RX ORDER — ACETAMINOPHEN 500 MG
TABLET ORAL
Status: COMPLETED
Start: 2019-06-07 | End: 2019-06-07

## 2019-06-07 RX ORDER — IBUPROFEN 600 MG/1
600 TABLET ORAL EVERY 6 HOURS PRN
Qty: 120 TABLET | Refills: 0 | Status: SHIPPED | OUTPATIENT
Start: 2019-06-07 | End: 2021-03-01 | Stop reason: ALTCHOICE

## 2019-06-07 RX ORDER — ACETAMINOPHEN 500 MG
1000 TABLET ORAL ONCE
Status: COMPLETED | OUTPATIENT
Start: 2019-06-07 | End: 2019-06-07

## 2019-06-07 RX ORDER — KETOROLAC TROMETHAMINE 30 MG/ML
30 INJECTION, SOLUTION INTRAMUSCULAR; INTRAVENOUS ONCE
Status: COMPLETED | OUTPATIENT
Start: 2019-06-07 | End: 2019-06-07

## 2019-06-07 RX ADMIN — ACETAMINOPHEN 1000 MG: 500 TABLET, FILM COATED ORAL at 23:32

## 2019-06-07 RX ADMIN — KETOROLAC TROMETHAMINE 30 MG: 30 INJECTION, SOLUTION INTRAMUSCULAR at 22:56

## 2019-06-07 RX ADMIN — KETOROLAC TROMETHAMINE 30 MG: 30 INJECTION, SOLUTION INTRAMUSCULAR; INTRAVENOUS at 22:56

## 2019-06-07 RX ADMIN — Medication 1000 MG: at 23:32

## 2019-06-07 ASSESSMENT — PAIN DESCRIPTION - LOCATION: LOCATION: KNEE

## 2019-06-07 ASSESSMENT — PAIN SCALES - GENERAL
PAINLEVEL_OUTOF10: 6
PAINLEVEL_OUTOF10: 8

## 2019-06-07 ASSESSMENT — PAIN DESCRIPTION - ORIENTATION: ORIENTATION: LEFT

## 2019-06-08 NOTE — ED PROVIDER NOTES
ATTENDING  ADDENDUM     Care of this patient was assumed from preceding physician. The patient was seen for Knee Pain (left)  . The patient's initial evaluation and plan have been discussed with the prior provider who initially evaluated the patient. Nursing Notes, Past Medical Hx, Past Surgical Hx, Social Hx, Allergies, and Family Hx were all reviewed. When the patient was signed out to me were waiting for the results of the patient's x-rays to come back. Her x-rays to come back negative for any acute fracture. Patient still having pain relievers and Toradol for that. She will be sent home on ibuprofen, an air splint and crutches and followed up with her doctor  DIFFERENTIAL DIAGNOSIS/ MDM:           Follow Exit Care instructions closely. I have reviewed the disposition diagnosis with the patient and or their family/guardian. I have answered their questions and given discharge instructions. They voiced understanding of these instructions and did not have any further questions or complaints. DIAGNOSTIC RESULTS     RADIOLOGY:   Non-plain film images such as CT, Ultrasound and MRI are read by the radiologist. Plain radiographic images are visualized and preliminarily interpreted by the emergency physician with the below findings:  XR KNEE LEFT (3 VIEWS)   Final Result   No acute osseous abnormality of the left tibia/fibula      No acute osseous abnormality of the left knee. XR TIBIA FIBULA LEFT (2 VIEWS)   Final Result   No acute osseous abnormality of the left tibia/fibula      No acute osseous abnormality of the left knee. LABS:  No results found for this visit on 06/07/19.     ABNORMAL LABS:  Labs Reviewed - No data to display     EKG:      EMERGENCY DEPARTMENT COURSE:   Vitals:    Vitals:    06/07/19 2150 06/07/19 2258   BP: (!) 140/79 138/81   Pulse: 111 93   Resp: 18 16   Temp: 98.6 °F (37 °C)    TempSrc: Tympanic    SpO2: 98% 97%   Weight: 220 lb (99.8 kg)    Height: 5' 9\" (1.753 m)      -------------------------  BP: 138/81, Temp: 98.6 °F (37 °C), Pulse: 93, Resp: 16          FINAL IMPRESSION      1. Strain of left knee, initial encounter    2. Sprain of left ankle, unspecified ligament, initial encounter          DISPOSITION/PLAN   DISPOSITION Decision To Discharge 06/07/2019 10:59:39 PM    I have reviewed the disposition diagnosis with the patient and or their family/guardian. I have answered their questions and given discharge instructions. They voiced understanding of these instructions and did not have any further questions or complaints. Reevaluation: To my independent examination patient has some tenderness at the left lateral aspect of her knee and also some tenderness of the calf to palpation. She definitely  has some lateral malleolus tenderness as well. I will give her some Toradol for this and believe this is all contusion-  the patient will be discharged home with ibuprofen and ice and ankle splint and crutches-she is due to follow-up with her own doctor in 2 days.       Condition on Disposition      Stable    PATIENT REFERRED TO:  Lennox Der, DO  [address]  [phone]    In 2 days        DISCHARGE MEDICATIONS:  New Prescriptions    IBUPROFEN (IBU) 600 MG TABLET    Take 1 tablet by mouth every 6 hours as needed for Pain       (Please note that portions of this note were completed with a voice recognition program.  Efforts were made to edit the dictations but occasionally words are mis-transcribed.)    Dowell MD  Attending Emergency Physician        Jeff Polo MD  06/07/19 5561

## 2019-06-08 NOTE — ED PROVIDER NOTES
(PROZAC) 20 MG CAPSULE    Take 1 capsule with 10 mg capsule daily for total dose of 30 mg daily    HYDROXYZINE (ATARAX) 50 MG TABLET    take 1 tablet by mouth three times a day if needed for anxiety    IBUPROFEN (ADVIL;MOTRIN) 800 MG TABLET    Take 1 tablet by mouth every 8 hours as needed for Pain    LORAZEPAM (ATIVAN) 0.5 MG TABLET    take 1 tablet by mouth twice a day if needed    TRAZODONE (DESYREL) 150 MG TABLET    Take 150 mg by mouth nightly        ALLERGIES     is allergic to codeine and tape [adhesive tape]. FAMILY HISTORY     indicated that her mother is alive. She indicated that her father is alive. She indicated that her brother is alive. She indicated that her maternal grandmother is alive. She indicated that her maternal grandfather is alive. She indicated that her paternal grandmother is alive. She indicated that her paternal grandfather is alive. family history includes Asthma in her brother; Heart Attack in her maternal grandfather; Heart Disease in her maternal grandfather; Mental Illness in her maternal grandmother and mother; Other in her brother; Seizures in her mother. SOCIAL HISTORY      reports that she has been smoking cigarettes. She has a 1.50 pack-year smoking history. She has never used smokeless tobacco. She reports that she has current or past drug history. Drug: Marijuana. She reports that she does not drink alcohol. PHYSICAL EXAM     INITIAL VITALS:  height is 5' 9\" (1.753 m) and weight is 220 lb (99.8 kg). Her tympanic temperature is 98.6 °F (37 °C). Her blood pressure is 140/79 (abnormal) and her pulse is 111. Her respiration is 18 and oxygen saturation is 98%. Physical Exam   Constitutional: She is oriented to person, place, and time. She appears well-developed and well-nourished. No distress. HENT:   Head: Normocephalic and atraumatic. Mouth/Throat: Oropharynx is clear and moist.   Eyes: Pupils are equal, round, and reactive to light.  Conjunctivae and EOM are normal.   Neck: Normal range of motion. Neck supple. No tracheal deviation present. Cardiovascular: Normal rate, regular rhythm and intact distal pulses. Pulmonary/Chest: Effort normal and breath sounds normal. No respiratory distress. Musculoskeletal: Normal range of motion. She exhibits no edema or tenderness. No swelling or deformity of either lower extremity. She is tender at the proximal fibular head as well as throughout her entire left knee. No joint effusion. No distal deficits. Neurological: She is alert and oriented to person, place, and time. Skin: Skin is warm and dry. Psychiatric: She has a normal mood and affect. Her behavior is normal. Judgment and thought content normal.   Vitals reviewed. MDM:   Ice has been applied. Patient took ibuprofen prior to arrival.  I've ordered an x-ray of her left knee and left leg. It is now 2230 hrs. and I have endorsed this patient to Dr. Jackie Sena. Please see his addendum. The patient was given the following medications:  No orders of the defined types were placed in this encounter.          (Please note that portions of this note werecompleted with a voice recognition program.  Efforts were made to edit the dictations but occasionally words are mis-transcribed.)    Lucero James MD, F.A.C.E.P, F.A.A.E.M  Emergency Physician Attending          Lucero James MD  06/07/19 1852

## 2019-06-10 ENCOUNTER — TELEPHONE (OUTPATIENT)
Dept: INTERNAL MEDICINE | Age: 24
End: 2019-06-10

## 2019-06-10 DIAGNOSIS — S86.912D KNEE STRAIN, LEFT, SUBSEQUENT ENCOUNTER: Primary | ICD-10-CM

## 2019-06-10 DIAGNOSIS — S96.912D ANKLE STRAIN, LEFT, SUBSEQUENT ENCOUNTER: ICD-10-CM

## 2019-06-10 NOTE — TELEPHONE ENCOUNTER
Patient was in ER on Friday night for her Left knee and ankle. They did xrays and they told her to call to see if you wanted more imaging on this. She stated that she has no control over this lower leg. She is in an air cast and on crutches.   Please advise

## 2019-06-11 ENCOUNTER — OFFICE VISIT (OUTPATIENT)
Dept: ORTHOPEDIC SURGERY | Age: 24
End: 2019-06-11
Payer: MEDICARE

## 2019-06-11 VITALS
WEIGHT: 228 LBS | BODY MASS INDEX: 33.77 KG/M2 | SYSTOLIC BLOOD PRESSURE: 122 MMHG | HEART RATE: 94 BPM | HEIGHT: 69 IN | DIASTOLIC BLOOD PRESSURE: 70 MMHG

## 2019-06-11 DIAGNOSIS — S83.92XA SPRAIN OF LEFT KNEE, UNSPECIFIED LIGAMENT, INITIAL ENCOUNTER: ICD-10-CM

## 2019-06-11 DIAGNOSIS — S93.402A SPRAIN OF LEFT ANKLE, UNSPECIFIED LIGAMENT, INITIAL ENCOUNTER: Primary | ICD-10-CM

## 2019-06-11 PROCEDURE — G8417 CALC BMI ABV UP PARAM F/U: HCPCS | Performed by: PHYSICIAN ASSISTANT

## 2019-06-11 PROCEDURE — 4004F PT TOBACCO SCREEN RCVD TLK: CPT | Performed by: PHYSICIAN ASSISTANT

## 2019-06-11 PROCEDURE — 99202 OFFICE O/P NEW SF 15 MIN: CPT | Performed by: PHYSICIAN ASSISTANT

## 2019-06-11 PROCEDURE — G8427 DOCREV CUR MEDS BY ELIG CLIN: HCPCS | Performed by: PHYSICIAN ASSISTANT

## 2019-06-11 RX ORDER — METHYLPREDNISOLONE 4 MG/1
TABLET ORAL
Qty: 1 KIT | Refills: 0 | Status: SHIPPED | OUTPATIENT
Start: 2019-06-11 | End: 2019-06-14 | Stop reason: ALTCHOICE

## 2019-06-12 NOTE — PROGRESS NOTES
[address]  [facility]  Dept: [phone]  Loc: [phone]    Date of Service:  6/11/2019    Sandhya Levy  YOB: 1995  MRN: [id number]      Chyna Farley is a 21 y.o. female who comes in today with increasing pain within this left knee and ankle. The patient stated that she was in her driveway and she ended up having a fall and landing awkwardly on her left knee and ankle. She went to the emergency room and x-rays negative for any acute fractures or bony abnormalities. The patient states all of her pain is located in the back of the calf and in the lateral aspect of the knee. She denies really any pain at all within the foot or ankle at this time. PHYSICAL EXAM:  This is a 21 y.o. female, alert and oriented x3, cooperative, in no acute distress. Sensation intact. Neurovascularly intact. She is able to flex and extend toes, flex and extend the ankle. She does have pain with stressing of the LCL. No pain with stressing of the MCL. She does have tenderness to palpation about the belly of the calf. RADIOLOGY: X-rays of left knee and ankle are negative for acute fractures or bony abnormalities. IMPRESSION:  Left knee and ankle sprain. PLAN:  We are going to get her a Medrol Dosepak. We will get her into physical therapy for left knee and ankle to see if this will help relieve this current pain that she is having at this time. If she is not getting any improvement and she is not able to tolerate coming off the crutches we will have her come back and see us and discuss further treatment options at that time. Elizabeth Martinez am personally transcribing for Kit Ambrocio PA-C 6/12/19 at 2:06 PM.    I, Kit Ambrocio PA-C, personally performed the services described in this document as transcribed by Jim Perera, and it is both accurate and complete.     Electronically signed by Kit Ambrocio EDDIE on 6/13/2019 at 7:28 AM

## 2019-06-14 ENCOUNTER — OFFICE VISIT (OUTPATIENT)
Dept: INTERNAL MEDICINE | Age: 24
End: 2019-06-14
Payer: MEDICARE

## 2019-06-14 VITALS
DIASTOLIC BLOOD PRESSURE: 80 MMHG | BODY MASS INDEX: 33.76 KG/M2 | HEIGHT: 69 IN | SYSTOLIC BLOOD PRESSURE: 120 MMHG | RESPIRATION RATE: 16 BRPM | WEIGHT: 227.96 LBS | HEART RATE: 78 BPM

## 2019-06-14 DIAGNOSIS — F43.10 PTSD (POST-TRAUMATIC STRESS DISORDER): ICD-10-CM

## 2019-06-14 DIAGNOSIS — S86.912D KNEE STRAIN, LEFT, SUBSEQUENT ENCOUNTER: ICD-10-CM

## 2019-06-14 DIAGNOSIS — S96.912D ANKLE STRAIN, LEFT, SUBSEQUENT ENCOUNTER: ICD-10-CM

## 2019-06-14 DIAGNOSIS — F41.0 PANIC DISORDER: Primary | ICD-10-CM

## 2019-06-14 PROCEDURE — 4004F PT TOBACCO SCREEN RCVD TLK: CPT | Performed by: INTERNAL MEDICINE

## 2019-06-14 PROCEDURE — G8427 DOCREV CUR MEDS BY ELIG CLIN: HCPCS | Performed by: INTERNAL MEDICINE

## 2019-06-14 PROCEDURE — G8417 CALC BMI ABV UP PARAM F/U: HCPCS | Performed by: INTERNAL MEDICINE

## 2019-06-14 PROCEDURE — 99213 OFFICE O/P EST LOW 20 MIN: CPT | Performed by: INTERNAL MEDICINE

## 2019-06-14 RX ORDER — ALPRAZOLAM 1 MG/1
1 TABLET ORAL 3 TIMES DAILY PRN
Qty: 45 TABLET | Refills: 0 | Status: SHIPPED | OUTPATIENT
Start: 2019-06-14 | End: 2019-07-01 | Stop reason: SDUPTHER

## 2019-06-14 NOTE — PROGRESS NOTES
daily          IMMUNIZATIONS: Reviewed for influenza and pneumococcal status as indicated in electronic record. REVIEW OF SYSTEMS:     Please see history of chief complaint above; otherwise no new problems with respect to General, HEENT, Cardiovascular, Respiratory, Gastrointestinal, Genitourinary, Endocrinologic, Musculoskeletal, or Neuropsychiatric complaints. PHYSICAL EXAMINATION:    Wt Readings from Last 2 Encounters:   06/14/19 227 lb 15.3 oz (103.4 kg)   06/11/19 228 lb (103.4 kg)       Vitals:    06/14/19 1447   BP: 120/80   Site: Right Upper Arm   Position: Sitting   Cuff Size: Medium Adult   Pulse: 78   Resp: 16   Weight: 227 lb 15.3 oz (103.4 kg)   Height: 5' 9.02\" (1.753 m)     Body mass index is 33.65 kg/m². General: This is a 21 y.o.  female who is alert and oriented to person, place and time. She appears to be her stated age and does not appear to be in any acute distress. Skin: Skin color, texture, turgor normal. No rashes or lesions. HEENT/Neck: Head: Normal, normocephalic, atraumatic. Eye: Normal external eye, conjunctiva, lids cornea, NINA. Ears: Normal TM's bilaterally. Normal auditory canals and external ears. Non-tender. Nose: Normal external nose, mucus membranes and septum. Pharynx: Dental Hygiene adequate. Normal buccal mucosa. Normal pharynx. Neck / Thyroid: Supple, no masses, nodes, nodules or enlargement. Lungs: Normal - CTA without rales, rhonchi, or wheezing. Heart: regular rate and rhythm, S1, S2 normal, no murmur, click, rub or gallop No S3 or S4. Abdomen: Obese, soft, non-distended, non-tender, normal active bowel sounds, no masses palpated and no hepatosplenomegaly  Extremities: There is no clubbing, cyanosis, or edema in any of the extremities. Left ankle is swollen and tender. Neurologic:  cranial nerves II-XII are grossly intact    ASSESSMENT/PLAN:    1. Panic disorder, improved  2.  PTSD (post-traumatic stress disorder)  - The Xanax is helping her  - We will refill the Xanax and we will see how she does with it while working on finding a less stressful job  - ALPRAZolam (XANAX) 1 MG tablet; Take 1 tablet by mouth 3 times daily as needed for Anxiety for up to 15 days. Dispense: 45 tablet; Refill: 0    3. Ankle strain, left, subsequent encounter  4. Knee strain, left, subsequent encounter  - We discussed physical therapy. She really ought to give it a try before deciding it's not going to work for her  - She will go down and set up another session. No orders of the defined types were placed in this encounter. Requested Prescriptions      No prescriptions requested or ordered in this encounter       Medications as ordered above. Return in about 1 month (around 7/14/2019).         Electronically signed by Anthony Hendricks DO on 6/14/2019 at 3:30 PM  Internal Medicine

## 2019-07-01 DIAGNOSIS — F41.0 PANIC DISORDER: ICD-10-CM

## 2019-07-01 DIAGNOSIS — F43.10 PTSD (POST-TRAUMATIC STRESS DISORDER): ICD-10-CM

## 2019-07-01 RX ORDER — ALPRAZOLAM 1 MG/1
1 TABLET ORAL 3 TIMES DAILY PRN
Qty: 45 TABLET | Refills: 0 | Status: SHIPPED | OUTPATIENT
Start: 2019-07-01 | End: 2019-07-12 | Stop reason: SDUPTHER

## 2019-07-12 ENCOUNTER — OFFICE VISIT (OUTPATIENT)
Dept: INTERNAL MEDICINE | Age: 24
End: 2019-07-12
Payer: MEDICARE

## 2019-07-12 VITALS
HEART RATE: 80 BPM | DIASTOLIC BLOOD PRESSURE: 80 MMHG | RESPIRATION RATE: 16 BRPM | HEIGHT: 69 IN | WEIGHT: 222.4 LBS | SYSTOLIC BLOOD PRESSURE: 126 MMHG | BODY MASS INDEX: 32.94 KG/M2

## 2019-07-12 DIAGNOSIS — F41.0 PANIC DISORDER: Primary | ICD-10-CM

## 2019-07-12 DIAGNOSIS — F31.9 BIPOLAR DISEASE, CHRONIC (HCC): ICD-10-CM

## 2019-07-12 DIAGNOSIS — S86.912D KNEE STRAIN, LEFT, SUBSEQUENT ENCOUNTER: ICD-10-CM

## 2019-07-12 DIAGNOSIS — S96.912D ANKLE STRAIN, LEFT, SUBSEQUENT ENCOUNTER: ICD-10-CM

## 2019-07-12 DIAGNOSIS — F43.10 PTSD (POST-TRAUMATIC STRESS DISORDER): ICD-10-CM

## 2019-07-12 PROCEDURE — G8417 CALC BMI ABV UP PARAM F/U: HCPCS | Performed by: INTERNAL MEDICINE

## 2019-07-12 PROCEDURE — 99213 OFFICE O/P EST LOW 20 MIN: CPT | Performed by: INTERNAL MEDICINE

## 2019-07-12 PROCEDURE — G8427 DOCREV CUR MEDS BY ELIG CLIN: HCPCS | Performed by: INTERNAL MEDICINE

## 2019-07-12 PROCEDURE — 4004F PT TOBACCO SCREEN RCVD TLK: CPT | Performed by: INTERNAL MEDICINE

## 2019-07-12 RX ORDER — ALPRAZOLAM 1 MG/1
1 TABLET ORAL 3 TIMES DAILY PRN
Qty: 90 TABLET | Refills: 0 | Status: SHIPPED | OUTPATIENT
Start: 2019-07-12 | End: 2019-08-08 | Stop reason: SDUPTHER

## 2019-07-12 NOTE — PROGRESS NOTES
Endocrinologic, Musculoskeletal, or Neuropsychiatric complaints. PHYSICAL EXAMINATION:    Wt Readings from Last 2 Encounters:   07/12/19 222 lb 6.4 oz (100.9 kg)   06/14/19 227 lb 15.3 oz (103.4 kg)       Vitals:    07/12/19 1447   BP: 126/80   Site: Right Upper Arm   Position: Sitting   Cuff Size: Medium Adult   Pulse: 80   Resp: 16   Weight: 222 lb 6.4 oz (100.9 kg)   Height: 5' 9.02\" (1.753 m)     Body mass index is 32.83 kg/m². General: This is a 25 y.o.  female who is alert and oriented to person, place and time. She appears to be her stated age and does not appear to be in any acute distress. Less anxious than previous visits. Skin: Skin color, texture, turgor normal. No rashes or lesions. HEENT/Neck: Head: Normal, normocephalic, atraumatic. Eye: Normal external eye, conjunctiva, lids cornea, NINA. Ears: Normal TM's bilaterally. Normal auditory canals and external ears. Non-tender. Nose: Normal external nose, mucus membranes and septum. Pharynx: Dental Hygiene adequate. Normal buccal mucosa. Normal pharynx. Neck / Thyroid: Supple, no masses, nodes, nodules or enlargement. Lungs: Normal - CTA without rales, rhonchi, or wheezing. Heart: regular rate and rhythm, S1, S2 normal, no murmur, click, rub or gallop No S3 or S4. Abdomen: Obese, soft, non-distended, non-tender, normal active bowel sounds, no masses palpated and no hepatosplenomegaly  Extremities: There is no clubbing, cyanosis, or edema in any of the extremities. Neurologic:  cranial nerves II-XII are grossly intact  Osteopathic Structural Examination: Patient was examined in the seated and standing positions. There was full range of motion in the cervical, thoracic, and lumbar spines. No scoliosis. No change in thoracic kyphosis or lumbar lordosis. No increased paravertebral muscle tenderness. ASSESSMENT/PLAN:    1. Panic disorder, stable  2.  PTSD (post-traumatic stress disorder), stable  - She will continue the Xanax at the

## 2019-08-08 ENCOUNTER — APPOINTMENT (OUTPATIENT)
Dept: GENERAL RADIOLOGY | Age: 24
End: 2019-08-08
Payer: MEDICARE

## 2019-08-08 ENCOUNTER — HOSPITAL ENCOUNTER (EMERGENCY)
Age: 24
Discharge: HOME | End: 2019-08-08
Attending: EMERGENCY MEDICINE
Payer: MEDICARE

## 2019-08-08 VITALS
DIASTOLIC BLOOD PRESSURE: 92 MMHG | RESPIRATION RATE: 12 BRPM | SYSTOLIC BLOOD PRESSURE: 137 MMHG | HEART RATE: 97 BPM | OXYGEN SATURATION: 98 % | WEIGHT: 217 LBS | BODY MASS INDEX: 32.03 KG/M2

## 2019-08-08 DIAGNOSIS — F41.0 PANIC DISORDER: ICD-10-CM

## 2019-08-08 DIAGNOSIS — S90.32XA CONTUSION OF LEFT FOOT, INITIAL ENCOUNTER: Primary | ICD-10-CM

## 2019-08-08 DIAGNOSIS — F43.10 PTSD (POST-TRAUMATIC STRESS DISORDER): ICD-10-CM

## 2019-08-08 PROCEDURE — 99283 EMERGENCY DEPT VISIT LOW MDM: CPT

## 2019-08-08 PROCEDURE — 6360000002 HC RX W HCPCS: Performed by: EMERGENCY MEDICINE

## 2019-08-08 PROCEDURE — 73630 X-RAY EXAM OF FOOT: CPT

## 2019-08-08 PROCEDURE — 96372 THER/PROPH/DIAG INJ SC/IM: CPT

## 2019-08-08 RX ORDER — ALPRAZOLAM 1 MG/1
1 TABLET ORAL 3 TIMES DAILY PRN
Qty: 90 TABLET | Refills: 0 | Status: SHIPPED | OUTPATIENT
Start: 2019-08-08 | End: 2019-09-04 | Stop reason: SDUPTHER

## 2019-08-08 RX ORDER — KETOROLAC TROMETHAMINE 30 MG/ML
30 INJECTION, SOLUTION INTRAMUSCULAR; INTRAVENOUS ONCE
Status: COMPLETED | OUTPATIENT
Start: 2019-08-08 | End: 2019-08-08

## 2019-08-08 RX ADMIN — KETOROLAC TROMETHAMINE 30 MG: 30 INJECTION, SOLUTION INTRAMUSCULAR at 19:12

## 2019-08-08 ASSESSMENT — PAIN DESCRIPTION - LOCATION: LOCATION: FOOT

## 2019-08-08 ASSESSMENT — PAIN DESCRIPTION - PROGRESSION: CLINICAL_PROGRESSION: NOT CHANGED

## 2019-08-08 ASSESSMENT — PAIN SCALES - GENERAL
PAINLEVEL_OUTOF10: 7
PAINLEVEL_OUTOF10: 7
PAINLEVEL_OUTOF10: 4

## 2019-08-08 ASSESSMENT — PAIN DESCRIPTION - FREQUENCY: FREQUENCY: CONTINUOUS

## 2019-08-08 ASSESSMENT — PAIN DESCRIPTION - ORIENTATION: ORIENTATION: LEFT

## 2019-08-08 ASSESSMENT — PAIN DESCRIPTION - PAIN TYPE: TYPE: ACUTE PAIN

## 2019-08-08 ASSESSMENT — ENCOUNTER SYMPTOMS: COLOR CHANGE: 0

## 2019-08-08 ASSESSMENT — PAIN DESCRIPTION - ONSET: ONSET: SUDDEN

## 2019-08-08 ASSESSMENT — PAIN DESCRIPTION - DESCRIPTORS: DESCRIPTORS: THROBBING

## 2019-08-08 ASSESSMENT — PAIN - FUNCTIONAL ASSESSMENT
PAIN_FUNCTIONAL_ASSESSMENT: 0-10
PAIN_FUNCTIONAL_ASSESSMENT: PREVENTS OR INTERFERES SOME ACTIVE ACTIVITIES AND ADLS

## 2019-09-04 DIAGNOSIS — F41.0 PANIC DISORDER: ICD-10-CM

## 2019-09-04 DIAGNOSIS — F43.10 PTSD (POST-TRAUMATIC STRESS DISORDER): ICD-10-CM

## 2019-09-04 RX ORDER — ALPRAZOLAM 1 MG/1
1 TABLET ORAL 3 TIMES DAILY PRN
Qty: 90 TABLET | Refills: 0 | Status: SHIPPED | OUTPATIENT
Start: 2019-09-04 | End: 2019-10-01 | Stop reason: SDUPTHER

## 2019-09-30 DIAGNOSIS — F43.10 PTSD (POST-TRAUMATIC STRESS DISORDER): ICD-10-CM

## 2019-09-30 DIAGNOSIS — F41.0 PANIC DISORDER: ICD-10-CM

## 2019-10-01 RX ORDER — ALPRAZOLAM 1 MG/1
1 TABLET ORAL 3 TIMES DAILY PRN
Qty: 90 TABLET | Refills: 0 | Status: SHIPPED | OUTPATIENT
Start: 2019-10-01 | End: 2019-10-29 | Stop reason: SDUPTHER

## 2019-10-29 DIAGNOSIS — F41.0 PANIC DISORDER: ICD-10-CM

## 2019-10-29 DIAGNOSIS — F43.10 PTSD (POST-TRAUMATIC STRESS DISORDER): ICD-10-CM

## 2019-11-01 RX ORDER — ALPRAZOLAM 1 MG/1
TABLET ORAL
Qty: 90 TABLET | Refills: 0 | Status: SHIPPED | OUTPATIENT
Start: 2019-11-01 | End: 2019-11-29 | Stop reason: SDUPTHER

## 2019-11-29 DIAGNOSIS — F41.0 PANIC DISORDER: ICD-10-CM

## 2019-11-29 DIAGNOSIS — F43.10 PTSD (POST-TRAUMATIC STRESS DISORDER): ICD-10-CM

## 2019-12-02 RX ORDER — ALPRAZOLAM 1 MG/1
TABLET ORAL
Qty: 90 TABLET | Refills: 0 | Status: SHIPPED | OUTPATIENT
Start: 2019-12-02 | End: 2020-01-03

## 2020-01-03 RX ORDER — ALPRAZOLAM 1 MG/1
TABLET ORAL
Qty: 90 TABLET | Refills: 0 | Status: SHIPPED | OUTPATIENT
Start: 2020-01-03 | End: 2020-02-03

## 2020-01-06 RX ORDER — ALPRAZOLAM 1 MG/1
TABLET ORAL
Qty: 90 TABLET | Refills: 0 | OUTPATIENT
Start: 2020-01-06 | End: 2020-02-05

## 2020-02-03 RX ORDER — ALPRAZOLAM 1 MG/1
TABLET ORAL
Qty: 90 TABLET | Refills: 0 | Status: SHIPPED | OUTPATIENT
Start: 2020-02-03 | End: 2020-03-03

## 2020-04-01 RX ORDER — ALPRAZOLAM 1 MG/1
TABLET ORAL
Qty: 90 TABLET | Refills: 0 | Status: SHIPPED | OUTPATIENT
Start: 2020-04-01 | End: 2020-05-04

## 2020-05-04 RX ORDER — ALPRAZOLAM 1 MG/1
TABLET ORAL
Qty: 90 TABLET | Refills: 0 | Status: SHIPPED | OUTPATIENT
Start: 2020-05-04 | End: 2020-05-28

## 2020-05-18 RX ORDER — ALBUTEROL SULFATE 90 UG/1
AEROSOL, METERED RESPIRATORY (INHALATION)
Qty: 18 G | Refills: 11 | Status: SHIPPED | OUTPATIENT
Start: 2020-05-18 | End: 2021-06-22

## 2020-05-28 RX ORDER — ALPRAZOLAM 1 MG/1
TABLET ORAL
Qty: 90 TABLET | Refills: 0 | Status: SHIPPED | OUTPATIENT
Start: 2020-05-28 | End: 2020-07-02

## 2020-07-31 NOTE — TELEPHONE ENCOUNTER
Pharmacy requesting a refill of the below medication which has been pended for you:     Requested Prescriptions     Pending Prescriptions Disp Refills    ALPRAZolam (XANAX) 1 MG tablet [Pharmacy Med Name: ALPRAZOLAM 1 MG TABLET] 90 tablet      Sig: take 1 tablet by mouth three times a day if needed       Last Appointment Date: 7/12/2019  Next Appointment Date: Visit date not found    Allergies   Allergen Reactions    Codeine Anaphylaxis    Tape Anthonette Karishma Tape] Other (See Comments)     Paper tape causes blisters

## 2020-08-03 RX ORDER — ALPRAZOLAM 1 MG/1
TABLET ORAL
Qty: 90 TABLET | Refills: 0 | Status: SHIPPED | OUTPATIENT
Start: 2020-08-03 | End: 2020-09-01

## 2020-08-03 NOTE — TELEPHONE ENCOUNTER
[place] today    Controlled Substance Monitoring:    Acute and Chronic Pain Monitoring:   RX Monitoring 8/3/2020   Attestation -   Periodic Controlled Substance Monitoring No signs of potential drug abuse or diversion identified.

## 2020-09-01 RX ORDER — ALPRAZOLAM 1 MG/1
TABLET ORAL
Qty: 90 TABLET | Refills: 0 | Status: SHIPPED | OUTPATIENT
Start: 2020-09-01 | End: 2020-09-28

## 2020-09-28 ENCOUNTER — VIRTUAL VISIT (OUTPATIENT)
Dept: INTERNAL MEDICINE | Age: 25
End: 2020-09-28
Payer: MEDICARE

## 2020-09-28 PROCEDURE — G8427 DOCREV CUR MEDS BY ELIG CLIN: HCPCS | Performed by: INTERNAL MEDICINE

## 2020-09-28 PROCEDURE — 99213 OFFICE O/P EST LOW 20 MIN: CPT | Performed by: INTERNAL MEDICINE

## 2020-09-28 RX ORDER — ALPRAZOLAM 1 MG/1
TABLET ORAL
Qty: 90 TABLET | Refills: 0 | Status: SHIPPED | OUTPATIENT
Start: 2020-09-28 | End: 2020-10-28

## 2020-10-28 RX ORDER — ALPRAZOLAM 1 MG/1
TABLET ORAL
Qty: 90 TABLET | Refills: 0 | Status: SHIPPED | OUTPATIENT
Start: 2020-10-28 | End: 2020-11-30

## 2020-10-29 NOTE — TELEPHONE ENCOUNTER
[place] checked today    Controlled Substance Monitoring:    Acute and Chronic Pain Monitoring:   RX Monitoring 10/28/2020   Attestation -   Periodic Controlled Substance Monitoring No signs of potential drug abuse or diversion identified.

## 2020-11-30 RX ORDER — ALPRAZOLAM 1 MG/1
TABLET ORAL
Qty: 90 TABLET | Refills: 0 | Status: SHIPPED | OUTPATIENT
Start: 2020-11-30 | End: 2020-12-28

## 2020-12-21 ENCOUNTER — VIRTUAL VISIT (OUTPATIENT)
Dept: INTERNAL MEDICINE | Age: 25
End: 2020-12-21
Payer: MEDICARE

## 2020-12-21 VITALS — BODY MASS INDEX: 37.03 KG/M2 | HEIGHT: 69 IN | WEIGHT: 250 LBS

## 2020-12-21 PROCEDURE — 99213 OFFICE O/P EST LOW 20 MIN: CPT | Performed by: INTERNAL MEDICINE

## 2020-12-21 PROCEDURE — G8427 DOCREV CUR MEDS BY ELIG CLIN: HCPCS | Performed by: INTERNAL MEDICINE

## 2020-12-21 PROCEDURE — 99211 OFF/OP EST MAY X REQ PHY/QHP: CPT

## 2020-12-21 RX ORDER — TIZANIDINE 4 MG/1
4 TABLET ORAL EVERY 8 HOURS PRN
Qty: 90 TABLET | Refills: 1 | Status: SHIPPED | OUTPATIENT
Start: 2020-12-21 | End: 2021-04-01 | Stop reason: SDUPTHER

## 2020-12-21 RX ORDER — NAPROXEN 500 MG/1
500 TABLET ORAL 2 TIMES DAILY PRN
Qty: 30 TABLET | Refills: 1 | Status: SHIPPED | OUTPATIENT
Start: 2020-12-21 | End: 2021-01-28 | Stop reason: SDUPTHER

## 2020-12-21 NOTE — PROGRESS NOTES
DR. Joseline Hannah - TELEHEALTH PROGRESS NOTE    CHIEF COMPLAINT/HISTORY OF CHIEF COMPLAINT: This 22 y.o.  female presents via TeleHealth visit today complaining of mid to lower back pain for the last 4 days. She picked up her child who was throwing a tantrum and then the child \"went limp\" in her arms and she felt immediate pain. The pain is mainly on the left side. Ibuprofen does help the pain, but only a little bit. Using a heating pad helps as well. The ibuprofen doesn't help as much as it did at first. She decided to see us for this issue. There are no other complaints. ALLERGIES/INTOLERANCES:   Allergies   Allergen Reactions    Codeine Anaphylaxis     Happened when in ER getting arm reset at age 9  States did have to be tubed    Tape Rosaura Carlton Tape] Other (See Comments)     Paper tape causes blisters       MEDICATIONS:   Outpatient Medications Marked as Taking for the 12/21/20 encounter (Virtual Visit) with Eileen Cabezas, DO   Medication Sig Dispense Refill    ALPRAZolam (XANAX) 1 MG tablet take 1 tablet by mouth three times a day if needed 90 tablet 0    albuterol sulfate  (90 Base) MCG/ACT inhaler inhale 1 to 2 puffs by mouth every 6 hours if needed 18 g 11    ibuprofen (IBU) 600 MG tablet Take 1 tablet by mouth every 6 hours as needed for Pain 120 tablet 0       IMMUNIZATIONS: Reviewed for influenza and pneumococcal status as indicated in electronic record. REVIEW OF SYSTEMS:     Please see history of chief complaint above; otherwise no new problems with respect to General, HEENT, Cardiovascular, Respiratory, Gastrointestinal, Genitourinary, Endocrinologic, Musculoskeletal, or Neuropsychiatric complaints. PHYSICAL EXAMINATION:    (Due to this being a TeleHealth encounter, evaluation of the following organ systems is limited: Vitals/General/Skin/HEENT/Lungs/Heart/Abdomen/Genitourinary/Musculoskeletal/Neurologic.     Wt Readings from Last 2 Encounters: 12/21/20 250 lb (113.4 kg)   08/08/19 217 lb (98.4 kg)       Patient-Reported Vitals 12/21/2020   Patient-Reported Weight 250 lb   Patient-Reported Height 5' 9\"   Patient-Reported Systolic 430   Patient-Reported Diastolic 84   Patient-Reported Pulse 90   Patient-Reported Temperature 98.0     Body mass index is 36.92 kg/m². General: This is a 22 y.o.  female who is alert and oriented to person, place and time. She appears to be her stated age and does not appear to be in any acute distress. She was able to follow commands. Affect appropriate for the situation. There were no hallucinations. Skin: Skin color, texture, turgor appears normal. No apparent rashes or lesions. HEENT/Neck: Head: Normal, normocephalic, atraumatic  Eye: Normal appearing external eye, conjunctiva, lids cornea. EOM appear intact bilaterally. Ears: Normal appearing external ears. Nose: Normal appearing external nose. No discharge. Pharynx: Mucous membranes appear moist.  Neck: No masses visualized. Pulmonary/Chest: Chest rises and falls symmetrically with inspiration and expiration. No accessory muscle use noted. Normal respiratory effort. No signs of difficulty breathing or respiratory distress visualized. Abdomen: Obese  Musculoskeletal: Normal appearing range of motion of neck. Extremities: Within the limitations of TeleHealth examination there does not appear to be any clubbing, cyanosis, or edema in any of the extremities. Neurologic: No gaze palsy. No facial asymmetry (Cranial Nerve 7 motor function). Exam limited due to video visit. Osteopathic Structural Examination: Unable to perform due to limitations of Telehealth. ASSESSMENT/PLAN:    1. Acute left-sided low back pain without sciatica  2.  Strain of lumbar region, initial encounter  - We will give her some naproxen 500 mg twice a day as needed for the pain  - We will also give her some Zanaflex 4 mg every 8 hours as needed - If that doesn't work she may need some physical therapy as well      No orders of the defined types were placed in this encounter. Requested Prescriptions     Signed Prescriptions Disp Refills    tiZANidine (ZANAFLEX) 4 MG tablet 90 tablet 1     Sig: Take 1 tablet by mouth every 8 hours as needed (muscle spasm)    naproxen (NAPROSYN) 500 MG tablet 30 tablet 1     Sig: Take 1 tablet by mouth 2 times daily as needed for Pain       She will return here as previously scheduled or sooner if needed. Pursuant to the emergency declaration under the [address] [place] [address], [address] [place] [address] and the [employer] Act, this TeleHealth visit was conducted, with patient's consent, to reduce the patient's risk of exposure to COVID-19 and provide continuity of care for an established patient. Services were provided through a video synchronous discussion virtually (using [email or web address] me) to substitute for in-person clinic visit. The originating site was the patient's home and the distant site was the provider's office. Patient's identity was verified via name and date of birth. Total time spent on the encounter was 20 minutes.           Electronically signed by Kayy Mckeon DO on 12/21/2020 at 2:07 PM  Internal Medicine

## 2020-12-21 NOTE — PATIENT INSTRUCTIONS
Patient Education        Back Pain: Care Instructions  Your Care Instructions     Back pain has many possible causes. It is often related to problems with muscles and ligaments of the back. It may also be related to problems with the nerves, discs, or bones of the back. Moving, lifting, standing, sitting, or sleeping in an awkward way can strain the back. Sometimes you don't notice the injury until later. Arthritis is another common cause of back pain. Although it may hurt a lot, back pain usually improves on its own within several weeks. Most people recover in 12 weeks or less. Using good home treatment and being careful not to stress your back can help you feel better sooner. Follow-up care is a key part of your treatment and safety. Be sure to make and go to all appointments, and call your doctor if you are having problems. It's also a good idea to know your test results and keep a list of the medicines you take. How can you care for yourself at home? · Sit or lie in positions that are most comfortable and reduce your pain. Try one of these positions when you lie down:  ? Lie on your back with your knees bent and supported by large pillows. ? Lie on the floor with your legs on the seat of a sofa or chair. ? Lie on your side with your knees and hips bent and a pillow between your legs. ? Lie on your stomach if it does not make pain worse. · Do not sit up in bed, and avoid soft couches and twisted positions. Bed rest can help relieve pain at first, but it delays healing. Avoid bed rest after the first day of back pain. · Change positions every 30 minutes. If you must sit for long periods of time, take breaks from sitting. Get up and walk around, or lie in a comfortable position. · Try using a heating pad on a low or medium setting for 15 to 20 minutes every 2 or 3 hours. Try a warm shower in place of one session with the heating pad. · You can also try an ice pack for 10 to 15 minutes every 2 to 3 hours. Put a thin cloth between the ice pack and your skin. · Take pain medicines exactly as directed. ? If the doctor gave you a prescription medicine for pain, take it as prescribed. ? If you are not taking a prescription pain medicine, ask your doctor if you can take an over-the-counter medicine. · Take short walks several times a day. You can start with 5 to 10 minutes, 3 or 4 times a day, and work up to longer walks. Walk on level surfaces and avoid hills and stairs until your back is better. · Return to work and other activities as soon as you can. Continued rest without activity is usually not good for your back. · To prevent future back pain, do exercises to stretch and strengthen your back and stomach. Learn how to use good posture, safe lifting techniques, and proper body mechanics. When should you call for help? Call your doctor now or seek immediate medical care if:    · You have new or worsening numbness in your legs.     · You have new or worsening weakness in your legs. (This could make it hard to stand up.)     · You lose control of your bladder or bowels. Watch closely for changes in your health, and be sure to contact your doctor if:    · You have a fever, lose weight, or don't feel well.     · You do not get better as expected. Where can you learn more? Go to https://gopogorosaeb.Demandforce. org and sign in to your [employer] account. Enter U648 in the [facility] box to learn more about \"Back Pain: Care Instructions. \"     If you do not have an account, please click on the \"Sign Up Now\" link. Current as of: March 2, 2020               Content Version: 12.6  © 6930-0243 [employer]. Care instructions adapted under license by [facility] ([facility]). If you have questions about a medical condition or this instruction, always ask your healthcare professional. [address] any warranty or liability for your use of this information. Patient Education        Learning About Relief for Back Pain  What is back strain? Back strain is an injury that happens when you overstretch, or pull, a muscle in your back. You may hurt your back in an accident or when you exercise or lift something. Most back pain gets better with rest and time. You can take care of yourself at home to help your back heal.  What can you do first to relieve back pain? When you first feel back pain, try these steps:  · Walk. Take a short walk (10 to 20 minutes) on a level surface (no slopes, hills, or stairs) every 2 to 3 hours. Walk only distances you can manage without pain, especially leg pain. · Relax. Find a comfortable position for rest. Some people are comfortable on the floor or a medium-firm bed with a small pillow under their head and another under their knees. Some people prefer to lie on their side with a pillow between their knees. Don't stay in one position for too long. · Try heat or ice. Try using a heating pad on a low or medium setting, or take a warm shower, for 15 to 20 minutes every 2 to 3 hours. Or you can buy single-use heat wraps that last up to 8 hours. You can also try an ice pack for 10 to 15 minutes every 2 to 3 hours. You can use an ice pack or a bag of frozen vegetables wrapped in a thin towel. There is not strong evidence that either heat or ice will help, but you can try them to see if they help. You may also want to try switching between heat and cold. · Take pain medicine exactly as directed. ? If the doctor gave you a prescription medicine for pain, take it as prescribed. ? If you are not taking a prescription pain medicine, ask your doctor if you can take an over-the-counter medicine. What else can you do? · Stretch and exercise. Exercises that increase flexibility may relieve your pain and make it easier for your muscles to keep your spine in a good, neutral position. And don't forget to keep walking. · Do self-massage. You can use self-massage to unwind after work or school or to energize yourself in the morning. You can easily massage your feet, hands, or neck. Self-massage works best if you are in comfortable clothes and are sitting or lying in a comfortable position. Use oil or lotion to massage bare skin. · Reduce stress. Back pain can lead to a vicious [place]: Distress about the pain tenses the muscles in your back, which in turn causes more pain. Learn how to relax your mind and your muscles to lower your stress. Where can you learn more? Go to https://Askvisory.com.TapCanvas. org and sign in to your [employer] account. Enter Z286 in the [employer] box to learn more about \"Learning About Relief for Back Pain. \"     If you do not have an account, please click on the \"Sign Up Now\" link. Current as of: March 2, 2020               Content Version: 12.6  © 2526-2341 [employer]. Care instructions adapted under license by [facility] ([facility]). If you have questions about a medical condition or this instruction, always ask your healthcare professional. [place] [address] any warranty or liability for your use of this information. Patient Education        Low Back Pain: Exercises  Introduction  Here are some examples of exercises for you to try. The exercises may be suggested for a condition or for rehabilitation. Start each exercise slowly. Ease off the exercises if you start to have pain. You will be told when to start these exercises and which ones will work best for you.   How to do the exercises  Press-up 1. Lie on your stomach, supporting your body with your forearms. 2. Press your elbows down into the floor to raise your upper back. As you do this, relax your stomach muscles and allow your back to arch without using your back muscles. As your press up, do not let your hips or pelvis come off the floor. 3. Hold for 15 to 30 seconds, then relax. 4. Repeat 2 to 4 times. Alternate arm and leg (bird dog) exercise   Do this exercise slowly. Try to keep your body straight at all times, and do not let one hip drop lower than the other. 1. Start on the floor, on your hands and knees. 2. Tighten your belly muscles. 3. Raise one leg off the floor, and hold it straight out behind you. Be careful not to let your hip drop down, because that will twist your trunk. 4. Hold for about 6 seconds, then lower your leg and switch to the other leg. 5. Repeat 8 to 12 times on each leg. 6. Over time, work up to holding for 10 to 30 seconds each time. 7. If you feel stable and secure with your leg raised, try raising the opposite arm straight out in front of you at the same time. Knee-to-chest exercise   1. Lie on your back with your knees bent and your feet flat on the floor. 2. Bring one knee to your chest, keeping the other foot flat on the floor (or keeping the other leg straight, whichever feels better on your lower back). 3. Keep your lower back pressed to the floor. Hold for at least 15 to 30 seconds. 4. Relax, and lower the knee to the starting position. 5. Repeat with the other leg. Repeat 2 to 4 times with each leg. 6. To get more stretch, put your other leg flat on the floor while pulling your knee to your chest.    Curl-ups   1. Lie on the floor on your back with your knees bent at a 90-degree angle. Your feet should be flat on the floor, about 12 inches from your buttocks. 2. Cross your arms over your chest. If this bothers your neck, try putting your hands behind your neck (not your head), with your elbows spread apart. 3. Slowly tighten your belly muscles and raise your shoulder blades off the floor. 4. Keep your head in line with your body, and do not press your chin to your chest.  5. Hold this position for 1 or 2 seconds, then slowly lower yourself back down to the floor. 6. Repeat 8 to 12 times. Pelvic tilt exercise   1. Lie on your back with your knees bent. 2. \"Brace\" your stomach. This means to tighten your muscles by pulling in and imagining your belly button moving toward your spine. You should feel like your back is pressing to the floor and your hips and pelvis are rocking back. 3. Hold for about 6 seconds while you breathe smoothly. 4. Repeat 8 to 12 times. Heel dig bridging   1. Lie on your back with both knees bent and your ankles bent so that only your heels are digging into the floor. Your knees should be bent about 90 degrees. 2. Then push your heels into the floor, squeeze your buttocks, and lift your hips off the floor until your shoulders, hips, and knees are all in a straight line. 3. Hold for about 6 seconds as you continue to breathe normally, and then slowly lower your hips back down to the floor and rest for up to 10 seconds. 4. Do 8 to 12 repetitions. Hamstring stretch in doorway   1. Lie on your back in a doorway, with one leg through the open door. 2. Slide your leg up the wall to straighten your knee. You should feel a gentle stretch down the back of your leg. 3. Hold the stretch for at least 15 to 30 seconds. Do not arch your back, point your toes, or bend either knee. Keep one heel touching the floor and the other heel touching the wall. 4. Repeat with your other leg. 5. Do 2 to 4 times for each leg.     Hip flexor stretch 1. Kneel on the floor with one knee bent and one leg behind you. Place your forward knee over your foot. Keep your other knee touching the floor. 2. Slowly push your hips forward until you feel a stretch in the upper thigh of your rear leg. 3. Hold the stretch for at least 15 to 30 seconds. Repeat with your other leg. 4. Do 2 to 4 times on each side. Wall sit   1. Stand with your back 10 to 12 inches away from a wall. 2. Lean into the wall until your back is flat against it. 3. Slowly slide down until your knees are slightly bent, pressing your lower back into the wall. 4. Hold for about 6 seconds, then slide back up the wall. 5. Repeat 8 to 12 times. Follow-up care is a key part of your treatment and safety. Be sure to make and go to all appointments, and call your doctor if you are having problems. It's also a good idea to know your test results and keep a list of the medicines you take. Where can you learn more? Go to https://Witsbits.Craftsvilla. org and sign in to your [employer] account. Enter R254 in the [facility] box to learn more about \"Low Back Pain: Exercises. \"     If you do not have an account, please click on the \"Sign Up Now\" link. Current as of: March 2, 2020               Content Version: 12.6  © 2777-9106 [employer]orporated. Care instructions adapted under license by [facility] ([facility]). If you have questions about a medical condition or this instruction, always ask your healthcare professional. [place] [address] any warranty or liability for your use of this information.

## 2020-12-28 ENCOUNTER — TELEPHONE (OUTPATIENT)
Dept: INTERNAL MEDICINE | Age: 25
End: 2020-12-28

## 2020-12-28 RX ORDER — ALPRAZOLAM 1 MG/1
TABLET ORAL
Qty: 90 TABLET | Refills: 0 | Status: SHIPPED | OUTPATIENT
Start: 2020-12-28 | End: 2021-01-28

## 2020-12-28 NOTE — TELEPHONE ENCOUNTER
Pharmacy sent over a fax to have her xanax refilled. Patient called and wanted me to let you know she is leaving for vacation tomorrow afternoon.

## 2021-01-28 ENCOUNTER — VIRTUAL VISIT (OUTPATIENT)
Dept: INTERNAL MEDICINE | Age: 26
End: 2021-01-28
Payer: MEDICARE

## 2021-01-28 VITALS — WEIGHT: 240 LBS | BODY MASS INDEX: 35.55 KG/M2 | HEIGHT: 69 IN

## 2021-01-28 DIAGNOSIS — F43.10 PTSD (POST-TRAUMATIC STRESS DISORDER): ICD-10-CM

## 2021-01-28 DIAGNOSIS — E66.9 CLASS 2 OBESITY WITHOUT SERIOUS COMORBIDITY WITH BODY MASS INDEX (BMI) OF 35.0 TO 35.9 IN ADULT, UNSPECIFIED OBESITY TYPE: ICD-10-CM

## 2021-01-28 DIAGNOSIS — F41.0 PANIC DISORDER: ICD-10-CM

## 2021-01-28 DIAGNOSIS — F17.210 CIGARETTE NICOTINE DEPENDENCE WITHOUT COMPLICATION: ICD-10-CM

## 2021-01-28 DIAGNOSIS — Z72.0 TOBACCO USE: ICD-10-CM

## 2021-01-28 DIAGNOSIS — F31.9 BIPOLAR DISEASE, CHRONIC (HCC): ICD-10-CM

## 2021-01-28 DIAGNOSIS — F41.0 PANIC DISORDER: Primary | ICD-10-CM

## 2021-01-28 PROCEDURE — 99214 OFFICE O/P EST MOD 30 MIN: CPT | Performed by: INTERNAL MEDICINE

## 2021-01-28 PROCEDURE — 99211 OFF/OP EST MAY X REQ PHY/QHP: CPT | Performed by: INTERNAL MEDICINE

## 2021-01-28 PROCEDURE — G8427 DOCREV CUR MEDS BY ELIG CLIN: HCPCS | Performed by: INTERNAL MEDICINE

## 2021-01-28 PROCEDURE — 99406 BEHAV CHNG SMOKING 3-10 MIN: CPT | Performed by: INTERNAL MEDICINE

## 2021-01-28 PROCEDURE — G8417 CALC BMI ABV UP PARAM F/U: HCPCS | Performed by: INTERNAL MEDICINE

## 2021-01-28 PROCEDURE — G8484 FLU IMMUNIZE NO ADMIN: HCPCS | Performed by: INTERNAL MEDICINE

## 2021-01-28 PROCEDURE — 4004F PT TOBACCO SCREEN RCVD TLK: CPT | Performed by: INTERNAL MEDICINE

## 2021-01-28 RX ORDER — ALPRAZOLAM 1 MG/1
TABLET ORAL
Qty: 90 TABLET | Refills: 0 | Status: SHIPPED | OUTPATIENT
Start: 2021-01-28 | End: 2021-03-01

## 2021-01-28 RX ORDER — NAPROXEN 500 MG/1
500 TABLET ORAL 2 TIMES DAILY PRN
Qty: 30 TABLET | Refills: 2 | Status: SHIPPED | OUTPATIENT
Start: 2021-01-28 | End: 2021-02-23

## 2021-01-28 RX ORDER — BUSPIRONE HYDROCHLORIDE 10 MG/1
10 TABLET ORAL 3 TIMES DAILY
Qty: 90 TABLET | Refills: 11 | Status: SHIPPED | OUTPATIENT
Start: 2021-01-28 | End: 2021-03-01 | Stop reason: SDUPTHER

## 2021-01-28 NOTE — PROGRESS NOTES
DR. Radha Mclean - TELEHEALTH PROGRESS NOTE    CHIEF COMPLAINT/HISTORY OF CHIEF COMPLAINT: This 22 y.o.  female presents via TeleHealth visit today to see how her anxiety and Bipolar disorder are doing since her last visit with us in September. At that time she was doing fairly well on the Xanax that we have been giving her for her anxiety. She thinks that the Xanax is working most of the time but sometimes things get more stressful and she sometimes needs to take an additional half a pill to calm her down some. The amount that she is getting is not lasting the whole month for her any more. She is still smoking about 1/2 pack of cigarettes a day. ALLERGIES/INTOLERANCES:   Allergies   Allergen Reactions    Codeine Anaphylaxis     Happened when in ER getting arm reset at age 9  States did have to be tubed    Tape Royal Reggie Tape] Other (See Comments)     Paper tape causes blisters       MEDICATIONS:   Outpatient Medications Marked as Taking for the 1/28/21 encounter (Virtual Visit) with Dolores España, DO   Medication Sig Dispense Refill    tiZANidine (ZANAFLEX) 4 MG tablet Take 1 tablet by mouth every 8 hours as needed (muscle spasm) 90 tablet 1    naproxen (NAPROSYN) 500 MG tablet Take 1 tablet by mouth 2 times daily as needed for Pain 30 tablet 1    albuterol sulfate  (90 Base) MCG/ACT inhaler inhale 1 to 2 puffs by mouth every 6 hours if needed 18 g 11    ibuprofen (IBU) 600 MG tablet Take 1 tablet by mouth every 6 hours as needed for Pain 120 tablet 0       IMMUNIZATIONS: Reviewed for influenza and pneumococcal status as indicated in electronic record. REVIEW OF SYSTEMS:     Please see history of chief complaint above; otherwise no new problems with respect to General, HEENT, Cardiovascular, Respiratory, Gastrointestinal, Genitourinary, Endocrinologic, Musculoskeletal, or Neuropsychiatric complaints.       PHYSICAL EXAMINATION: (Due to this being a TeleHealth encounter, evaluation of the following organ systems is limited: Vitals/General/Skin/HEENT/Lungs/Heart/Abdomen/Genitourinary/Musculoskeletal/Neurologic. Wt Readings from Last 2 Encounters:   01/28/21 240 lb (108.9 kg)   12/21/20 250 lb (113.4 kg)       Patient-Reported Vitals 1/28/2021   Patient-Reported Weight 240 lb   Patient-Reported Height 5' 9\"   Patient-Reported Systolic 719   Patient-Reported Diastolic 208   Patient-Reported Pulse 78   Patient-Reported Temperature 97.6     Body mass index is 35.44 kg/m². General: This is a 22 y.o.  female who is alert and oriented to person, place and time. She appears to be her stated age and does not appear to be in any acute distress. She was able to follow commands. Affect appropriate for the situation. There were no hallucinations. Skin: Skin color, texture, turgor appears normal. No apparent rashes or lesions. HEENT/Neck: Head: Normal, normocephalic, atraumatic  Eye: Normal appearing external eye, conjunctiva, lids cornea. EOM appear intact bilaterally. Ears: Normal appearing external ears. Nose: Normal appearing external nose. No discharge. Pharynx: Mucous membranes appear moist.  Neck: No masses visualized. Pulmonary/Chest: Chest rises and falls symmetrically with inspiration and expiration. No accessory muscle use noted. Normal respiratory effort. No signs of difficulty breathing or respiratory distress visualized. Abdomen: Obese  Musculoskeletal: Normal appearing range of motion of neck. Gait appears normal with no signs of ataxia. Extremities: Within the limitations of TeleHealth examination there does not appear to be any clubbing, cyanosis, or edema in any of the extremities. Neurologic: No gaze palsy. No facial asymmetry (Cranial Nerve 7 motor function). Exam limited due to video visit. Osteopathic Structural Examination: Unable to perform due to limitations of Telehealth.      ASSESSMENT/PLAN:

## 2021-01-28 NOTE — PATIENT INSTRUCTIONS
· When your relaxation time is over, you can bring yourself back to alertness by moving your fingers and toes and then your hands and feet and then stretching and moving your entire body. Sometimes people fall asleep during relaxation, but they usually wake up shortly afterward. · Always give yourself time to return to full alertness before you drive a car or do anything that might cause an accident if you are not fully alert. Never play a relaxation tape while driving a car. When should you call for help? Call 911 anytime you think you may need emergency care. For example, call if:    · You feel you cannot stop from hurting yourself or someone else. Watch closely for changes in your health, and be sure to contact your doctor if:    · Your panic attacks get worse.     · You have new or different anxiety.     · You are not getting better as expected. Where can you learn more? Go to https://P[employer].AxelaCare. org and sign in to your LawyerPaid account. Enter H601 in the "[employer] box to learn more about \"Panic Attacks: Care Instructions. \"     If you do not have an account, please click on the \"Sign Up Now\" link. Current as of: January 31, 2020               Content Version: 12.6  © 2006-2020 [employer]orporated. Care instructions adapted under license by [facility] ([facility]). If you have questions about a medical condition or this instruction, always ask your healthcare professional. [place] [address] any warranty or liability for your use of this information. Patient Education        buspirone  Pronunciation:  carmen garcia  Brand: BuSpar  What is the most important information I should know about buspirone? Do not use this medicine if you have used an MAO inhibitor in the past 14 days, such as isocarboxazid, linezolid, methylene blue injection, phenelzine, rasagiline, selegiline, or tranylcypromine. What is buspirone? Buspirone is used to treat symptoms of anxiety, such as fear, tension, irritability, dizziness, pounding heartbeat, and other physical symptoms. Buspirone is not an anti-psychotic medication and should not be used in place of medication prescribed by your doctor for mental illness. Buspirone may also be used for purposes not listed in this medication guide. What should I discuss with my healthcare provider before taking buspirone? You should not use buspirone if you are allergic to it. Do not use buspirone if you have used an MAO inhibitor in the past 14 days. A dangerous drug interaction could occur. MAO inhibitors include isocarboxazid, linezolid, methylene blue injection, phenelzine, rasagiline, selegiline, tranylcypromine, and others. You may need to wait 14 days after stopping buspirone before you can take an MAOI. Tell your doctor if you have ever had:  · kidney disease; or  · liver disease. Be sure your doctor knows if you also take stimulant medicine, opioid medicine, herbal products, or medicine for depression, mental illness, Parkinson's disease, migraine headaches, serious infections, or prevention of nausea and vomiting. These medicines may interact with buspirone and cause a serious condition called serotonin syndrome. Tell your doctor if you are pregnant. You should not breastfeed while using buspirone. Do not give this medicine to a child without medical advice. How should I take buspirone? Follow all directions on your prescription label and read all medication guides or instruction sheets. Your doctor may occasionally change your dose. Use the medicine exactly as directed. You may take buspirone with or without food but take it the same way each time. If you have switched to buspirone from another anxiety medication, you may need to slowly decrease your dose of the other medication rather than stopping suddenly. Some anxiety medications can cause withdrawal symptoms when you stop taking them suddenly after long-term use. Read and carefully follow any Instructions for Use provided with your medicine. Ask your doctor or pharmacist if you do not understand these instructions. Some buspirone tablets are scored so you can break the tablet into 2 or 3 pieces in order to take a smaller dose. Do not use a buspirone tablet if it has not been broken correctly and the piece is too big or too small. Follow your doctor's instructions about how much of the tablet to take. Buspirone can cause false positive results with certain medical tests. You may need to stop using the medicine for at least 48 hours before your test. Tell any doctor who treats you that you are using buspirone. Store at room temperature away from moisture, heat, and light. What happens if I miss a dose? Take the medicine as soon as you can, but skip the missed dose if it is almost time for your next dose. Do not take two doses at one time. What happens if I overdose? Seek emergency medical attention or call the Poison Help line at 1-708.973.4943. What should I avoid while taking buspirone? Avoid driving or hazardous activity until you know how this medicine will affect you. Your reactions could be impaired. Drinking alcohol may increase certain side effects of buspirone. Grapefruit may interact with buspirone and lead to unwanted side effects. Avoid the use of grapefruit products. What are the possible side effects of buspirone? Get emergency medical help if you have signs of an allergic reaction: hives; difficult breathing; swelling of your face, lips, tongue, or throat.   Call your doctor at once if you have:  · chest pain;  · shortness of breath; or · a light-headed feeling, like you might pass out. Seek medical attention right away if you have symptoms of serotonin syndrome, such as: agitation, hallucinations, fever, sweating, shivering, fast heart rate, muscle stiffness, twitching, loss of coordination, nausea, vomiting, or diarrhea. Common side effects may include:  · headache;  · dizziness, feeling light-headed;  · nausea; or  · feeling nervous or excited. This is not a complete list of side effects and others may occur. Call your doctor for medical advice about side effects. You may report side effects to FDA at 3-870-FDA-6709. What other drugs will affect buspirone? Using buspirone with other drugs that make you drowsy or slow your breathing can worsen these effects. Ask your doctor before using opioid medication, a sleeping pill, a muscle relaxer, or medicine for anxiety or seizures. Tell your doctor about all your current medicines. Many drugs can affect buspirone, especially:  · nefazodone;  · Ivan's wort;  · an antibiotic --clarithromycin, erythromycin, rifabutin, rifampin, rifapentine, telithromycin;  · antifungal medicine --itraconazole, ketoconazole;  · antiviral medicine to treat HIV/AIDS --efavirenz, indinavir, nelfinavir, nevirapine, ritonavir, saquinavir;  · cancer medicine --apalutamide, enzalutamide, mitotane;  · heart or blood pressure medicines --diltiazem, verapamil;  · seizure medicine --carbamazepine, oxcarbazepine, phenytoin, primidone; or  · steroid medicine --dexamethasone, prednisone. This list is not complete and many other drugs may affect buspirone. This includes prescription and over-the-counter medicines, vitamins, and herbal products. Not all possible drug interactions are listed here. Where can I get more information? Your pharmacist can provide more information about buspirone. Care instructions adapted under license by [facility] ([facility]). If you have questions about a medical condition or this instruction, always ask your healthcare professional. [address] any warranty or liability for your use of this information. Patient Education        Learning About Benefits From Quitting Smoking  How does quitting smoking make you healthier? If you're thinking about quitting smoking, you may have a few reasons to be smoke-free. Your health may be one of them. · When you quit smoking, you lower your risks for cancer, lung disease, heart attack, stroke, blood vessel disease, and blindness from macular degeneration. · When you're smoke-free, you get sick less often, and you heal faster. You are less likely to get colds, flu, bronchitis, and pneumonia. · As a nonsmoker, you may find that your mood is better and you are less stressed. When and how will you feel healthier? Quitting has real health benefits that start from day 1 of being smoke-free. And the longer you stay smoke-free, the healthier you get and the better you feel. The first hours  · After just 20 minutes, your blood pressure and heart rate go down. That means there's less stress on your heart and blood vessels. · Within 12 hours, the level of carbon monoxide in your blood drops back to normal. That makes room for more oxygen. With more oxygen in your body, you may notice that you have more energy than when you smoked. After 2 weeks  · Your lungs start to work better. · Your risk of heart attack starts to drop. After 1 month  · When your lungs are clear, you cough less and breathe deeper, so it's easier to be active. · Your sense of taste and smell return. That means you can enjoy food more than you have since you started smoking. Over the years  · Over the years, your risks of heart disease, heart attack, and stroke are lower. · After 10 years, your risk of dying from lung cancer is cut by about half. And your risk for many other types of cancer is lower too. How would quitting help others in your life? When you quit smoking, you improve the health of everyone who now breathes in your smoke. · Their heart, lung, and cancer risks drop, much like yours. · They are sick less. For babies and small children, living smoke-free means they're less likely to have ear infections, pneumonia, and bronchitis. · If you're a woman who is or will be pregnant someday, quitting smoking means a healthier . · Children who are close to you are less likely to become adult smokers. Where can you learn more? Go to https://CytoSolvpeCriterion Securityeb.PayScale. org and sign in to your 64 Pixels account. Enter [phone] [id number] [phone] in the ShootHome box to learn more about \"Learning About Benefits From Quitting Smoking. \"     If you do not have an account, please click on the \"Sign Up Now\" link. Current as of: 2020               Content Version: 12.6  © 4988-5676 [employer]. Care instructions adapted under license by [facility] ([facility]). If you have questions about a medical condition or this instruction, always ask your healthcare professional. [address] any warranty or liability for your use of this information. Patient Education        Stopping Smoking: Care Instructions  Your Care Instructions     Cigarette smokers crave the nicotine in cigarettes. Giving it up is much harder than simply changing a habit. Your body has to stop craving the nicotine. It is hard to quit, but you can do it. There are many tools that people use to quit smoking. You may find that combining tools works best for you. There are several steps to quitting. First you get ready to quit. Then you get support to help you. After that, you learn new skills and behaviors to become a nonsmoker. For many people, a necessary step is getting and using medicine. Your doctor will help you set up the plan that best meets your needs. You may want to attend a smoking cessation program to help you quit smoking. When you choose a program, look for one that has proven success. Ask your doctor for ideas. You will greatly increase your chances of success if you take medicine as well as get counseling or join a cessation program.  Some of the changes you feel when you first quit tobacco are uncomfortable. Your body will miss the nicotine at first, and you may feel short-tempered and grumpy. You may have trouble sleeping or concentrating. Medicine can help you deal with these symptoms. You may struggle with changing your smoking habits and rituals. The last step is the tricky one: Be prepared for the smoking urge to continue for a time. This is a lot to deal with, but keep at it. You will feel better. Follow-up care is a key part of your treatment and safety. Be sure to make and go to all appointments, and call your doctor if you are having problems. It's also a good idea to know your test results and keep a list of the medicines you take. How can you care for yourself at home? · Ask your family, friends, and coworkers for support. You have a better chance of quitting if you have help and support. · Join a support group, such as Nicotine Anonymous, for people who are trying to quit smoking. · Consider signing up for a smoking cessation program, such as the American Lung Association's Freedom from Smoking program.  · Get text messaging support. Go to the website at www.smokefree. gov to sign up for the [facility] program. · Set a quit date. Pick your date carefully so that it is not right in the middle of a big deadline or stressful time. Once you quit, do not even take a puff. Get rid of all ashtrays and lighters after your last cigarette. Clean your house and your clothes so that they do not smell of smoke. · Learn how to be a nonsmoker. Think about ways you can avoid those things that make you reach for a cigarette. ? Avoid situations that put you at greatest risk for smoking. For some people, it is hard to have a drink with friends without smoking. For others, they might skip a coffee break with coworkers who smoke. ? Change your daily routine. Take a different route to work or eat a meal in a different place. · Cut down on stress. Calm yourself or release tension by doing an activity you enjoy, such as reading a book, taking a hot bath, or gardening. · Talk to your doctor or pharmacist about nicotine replacement therapy, which replaces the nicotine in your body. You still get nicotine but you do not use tobacco. Nicotine replacement products help you slowly reduce the amount of nicotine you need. These products come in several forms, many of them available over-the-counter:  ? Nicotine patches  ? Nicotine gum and lozenges  ? Nicotine inhaler  · Ask your doctor about bupropion (Wellbutrin) or varenicline (Chantix), which are prescription medicines. They do not contain nicotine. They help you by reducing withdrawal symptoms, such as stress and anxiety. · Some people find hypnosis, acupuncture, and massage helpful for ending the smoking habit. · Eat a healthy diet and get regular exercise. Having healthy habits will help your body move past its craving for nicotine. · Be prepared to keep trying. Most people are not successful the first few times they try to quit. Do not get mad at yourself if you smoke again. Make a list of things you learned and think about when you want to try again, such as next week, next month, or next year. Where can you learn more? Go to https://chpewilderewkassi.News Distribution Network. org and sign in to your Hyperfair account. Enter J662 in the Ayondo box to learn more about \"Stopping Smoking: Care Instructions. \"     If you do not have an account, please click on the \"Sign Up Now\" link. Current as of: March 12, 2020               Content Version: 12.6  © 2006-2020 [employer] Inc[employer]. Care instructions adapted under license by [facility] ([facility]). If you have questions about a medical condition or this instruction, always ask your healthcare professional. [address] any warranty or liability for your use of this information.

## 2021-01-28 NOTE — TELEPHONE ENCOUNTER
OARRS checked today    Controlled Substance Monitoring:    Acute and Chronic Pain Monitoring:   RX Monitoring 1/28/2021   Attestation -   Periodic Controlled Substance Monitoring No signs of potential drug abuse or diversion identified.

## 2021-02-25 RX ORDER — NAPROXEN 500 MG/1
TABLET ORAL
Qty: 30 TABLET | Refills: 11 | Status: SHIPPED | OUTPATIENT
Start: 2021-02-25 | End: 2021-09-20 | Stop reason: SDUPTHER

## 2021-03-01 ENCOUNTER — VIRTUAL VISIT (OUTPATIENT)
Dept: INTERNAL MEDICINE | Age: 26
End: 2021-03-01
Payer: MEDICARE

## 2021-03-01 VITALS — HEIGHT: 69 IN | WEIGHT: 250 LBS | BODY MASS INDEX: 37.03 KG/M2

## 2021-03-01 DIAGNOSIS — F41.0 PANIC DISORDER: Primary | ICD-10-CM

## 2021-03-01 DIAGNOSIS — F31.9 BIPOLAR DISEASE, CHRONIC (HCC): ICD-10-CM

## 2021-03-01 DIAGNOSIS — F41.0 PANIC DISORDER: ICD-10-CM

## 2021-03-01 DIAGNOSIS — F43.10 PTSD (POST-TRAUMATIC STRESS DISORDER): ICD-10-CM

## 2021-03-01 PROCEDURE — G8427 DOCREV CUR MEDS BY ELIG CLIN: HCPCS | Performed by: INTERNAL MEDICINE

## 2021-03-01 PROCEDURE — 99214 OFFICE O/P EST MOD 30 MIN: CPT | Performed by: INTERNAL MEDICINE

## 2021-03-01 PROCEDURE — 99211 OFF/OP EST MAY X REQ PHY/QHP: CPT | Performed by: INTERNAL MEDICINE

## 2021-03-01 RX ORDER — BUSPIRONE HYDROCHLORIDE 15 MG/1
15 TABLET ORAL 3 TIMES DAILY
Qty: 90 TABLET | Refills: 11 | Status: SHIPPED | OUTPATIENT
Start: 2021-03-01 | End: 2022-06-13

## 2021-03-01 RX ORDER — ALPRAZOLAM 1 MG/1
TABLET ORAL
Qty: 90 TABLET | Refills: 0 | Status: SHIPPED | OUTPATIENT
Start: 2021-03-01 | End: 2021-03-26

## 2021-03-01 NOTE — PROGRESS NOTES
DR. Ibrahim Gramercy - TELEHEALTH PROGRESS NOTE    CHIEF COMPLAINT/HISTORY OF CHIEF COMPLAINT: This 22 y.o.  female presents via [address] visit today to see how her anxiety has been doing since we increased her Buspar at her last visit. She has been taking the Buspar at 10 mg three times a day and she feels that it is helping her keep her anxiety and panic better controlled. She is still using the Xanax three times a day most of the time but there are times where she is able to skip a dose, where she wasn't able to do so before. It does seem to be helping her panic disorder, post-traumatic stress disorder, and Bipolar disorder. She was wondering if the Buspar dose could be increased, because she would like to be able to cut back more on the amount of Xanax she has been using. Otherwise she seems to be doing fairly well and denies any other complaints. ALLERGIES/INTOLERANCES:   Allergies   Allergen Reactions    Codeine Anaphylaxis     Happened when in ER getting arm reset at age 9  States did have to be tubed    Tape Trenia Winn Tape] Other (See Comments)     Paper tape causes blisters       MEDICATIONS:   Outpatient Medications Marked as Taking for the 3/1/21 encounter (Virtual Visit) with Collette Cure, DO   Medication Sig Dispense Refill    naproxen (NAPROSYN) 500 MG tablet Take 1 tablet by mouth twice daily as needed for pain 30 tablet 11    busPIRone (BUSPAR) 10 MG tablet Take 1 tablet by mouth 3 times daily 90 tablet 11    tiZANidine (ZANAFLEX) 4 MG tablet Take 1 tablet by mouth every 8 hours as needed (muscle spasm) 90 tablet 1    albuterol sulfate  (90 Base) MCG/ACT inhaler inhale 1 to 2 puffs by mouth every 6 hours if needed 18 g 11       IMMUNIZATIONS: Reviewed for influenza and pneumococcal status as indicated in electronic record.     REVIEW OF SYSTEMS: Please see history of chief complaint above; otherwise no new problems with respect to General, HEENT, Cardiovascular, Respiratory, Gastrointestinal, Genitourinary, Endocrinologic, Musculoskeletal, or Neuropsychiatric complaints. PHYSICAL EXAMINATION:    (Due to this being a TeleHealth encounter, evaluation of the following organ systems is limited: Vitals/General/Skin/HEENT/Lungs/Heart/Abdomen/Genitourinary/Musculoskeletal/Neurologic. Wt Readings from Last 2 Encounters:   03/01/21 250 lb (113.4 kg)   01/28/21 240 lb (108.9 kg)       Patient-Reported Vitals 3/1/2021   Patient-Reported Weight 250 lb   Patient-Reported Height 5' 9\"   Patient-Reported Systolic 797   Patient-Reported Diastolic 84   Patient-Reported Pulse 90   Patient-Reported Temperature -     Body mass index is 36.92 kg/m². General: This is a 22 y.o.  female who is alert and oriented to person, place and time. She appears to be her stated age and does not appear to be in any acute distress. She was able to follow commands. Affect appropriate for the situation. There were no hallucinations. Skin: Skin color, texture, turgor appears normal. No apparent rashes or lesions. HEENT/Neck: Head: Normal, normocephalic, atraumatic  Eye: Normal appearing external eye, conjunctiva, lids cornea. EOM appear intact bilaterally. Ears: Normal appearing external ears. Nose: Normal appearing external nose. No discharge. Pharynx: Mucous membranes appear moist.  Neck: No masses visualized. Pulmonary/Chest: Chest rises and falls symmetrically with inspiration and expiration. No accessory muscle use noted. Normal respiratory effort. No signs of difficulty breathing or respiratory distress visualized. Abdomen: Obese  Musculoskeletal: Normal appearing range of motion of neck. Extremities: Within the limitations of TeleHealth examination there does not appear to be any clubbing, cyanosis, or edema in any of the extremities. Neurologic: No gaze palsy. No facial asymmetry (Cranial Nerve 7 motor function). Exam limited due to video visit. Osteopathic Structural Examination: Unable to perform due to limitations of Telehealth. ASSESSMENT/PLAN:    1. Panic disorder, improving  2. PTSD (post-traumatic stress disorder), stable  3. Bipolar disease, chronic ([facility] [address] stable  - Since the Buspar is working well for her we will try increasing it to 15 mg three times a day  - Hopefully that will help her decrease the amount of Xanax that she needs. - She will continue the Xanax at 3 times a day as needed  - We will again recheck her anxiety and panic in a month. No orders of the defined types were placed in this encounter. Requested Prescriptions     Signed Prescriptions Disp Refills    busPIRone (BUSPAR) 15 MG tablet 90 tablet 11     Sig: Take 15 mg by mouth 3 times daily       Medications as ordered above. Return in about 1 month (around 4/1/2021). Pursuant to the emergency declaration under the [address] [place] [address], [address] [place] [address] authority and the [employer] and [employer] Act, this TeleHealth visit was conducted, with patient's consent, to reduce the patient's risk of exposure to COVID-19 and provide continuity of care for an established patient. Services were provided through a video synchronous discussion virtually (using [email or web address] me) to substitute for in-person clinic visit. The originating site was the patient's home and the distant site was the provider's office. Patient's identity was verified via name and date of birth.           Electronically signed by Bryan Lazo DO on 3/1/2021 at 1:24 PM  Internal Medicine

## 2021-04-01 ENCOUNTER — VIRTUAL VISIT (OUTPATIENT)
Dept: INTERNAL MEDICINE | Age: 26
End: 2021-04-01
Payer: MEDICARE

## 2021-04-01 VITALS — WEIGHT: 260 LBS | HEIGHT: 69 IN | BODY MASS INDEX: 38.51 KG/M2

## 2021-04-01 DIAGNOSIS — F31.9 BIPOLAR DISEASE, CHRONIC (HCC): ICD-10-CM

## 2021-04-01 DIAGNOSIS — F41.0 PANIC DISORDER: Primary | ICD-10-CM

## 2021-04-01 DIAGNOSIS — F43.10 PTSD (POST-TRAUMATIC STRESS DISORDER): ICD-10-CM

## 2021-04-01 PROCEDURE — 99214 OFFICE O/P EST MOD 30 MIN: CPT | Performed by: INTERNAL MEDICINE

## 2021-04-01 PROCEDURE — 99211 OFF/OP EST MAY X REQ PHY/QHP: CPT | Performed by: INTERNAL MEDICINE

## 2021-04-01 PROCEDURE — G8427 DOCREV CUR MEDS BY ELIG CLIN: HCPCS | Performed by: INTERNAL MEDICINE

## 2021-04-01 RX ORDER — TIZANIDINE 4 MG/1
4 TABLET ORAL EVERY 8 HOURS PRN
Qty: 90 TABLET | Refills: 5 | Status: SHIPPED | OUTPATIENT
Start: 2021-04-01 | End: 2022-01-17

## 2021-04-01 NOTE — PROGRESS NOTES
DR. Gris Duron - TELEHEALTH PROGRESS NOTE    CHIEF COMPLAINT/HISTORY OF CHIEF COMPLAINT: This 22 y.o.  female presents via [facility] [address] visit today to see how her anxiety is doing since we increased the Buspar to 15 mg three times a day. Now she feels that her anxiety is doing a lot better since we did that. Most days she takes the Xanax twice but some days she has been able to get away with only taking one. She feels that the current regimen is working well for her. She does need a refill for her Zanaflex, which she is almost out of. There are no other complaints. ALLERGIES/INTOLERANCES:   Allergies   Allergen Reactions    Codeine Anaphylaxis     Happened when in ER getting arm reset at age 9  States did have to be tubed    Tape Roseline Spore Tape] Other (See Comments)     Paper tape causes blisters       MEDICATIONS:   Outpatient Medications Marked as Taking for the 4/1/21 encounter (Virtual Visit) with Viola Lin, DO   Medication Sig Dispense Refill    ALPRAZolam (XANAX) 1 MG tablet take 1 tablet by mouth three times a day if needed 90 tablet 0    busPIRone (BUSPAR) 15 MG tablet Take 15 mg by mouth 3 times daily 90 tablet 11    naproxen (NAPROSYN) 500 MG tablet Take 1 tablet by mouth twice daily as needed for pain 30 tablet 11    tiZANidine (ZANAFLEX) 4 MG tablet Take 1 tablet by mouth every 8 hours as needed (muscle spasm) 90 tablet 1    albuterol sulfate  (90 Base) MCG/ACT inhaler inhale 1 to 2 puffs by mouth every 6 hours if needed 18 g 11       IMMUNIZATIONS: Reviewed for influenza and pneumococcal status as indicated in electronic record. REVIEW OF SYSTEMS:     Please see history of chief complaint above; otherwise no new problems with respect to General, HEENT, Cardiovascular, Respiratory, Gastrointestinal, Genitourinary, Endocrinologic, Musculoskeletal, or Neuropsychiatric complaints.       PHYSICAL EXAMINATION:    (Due to this being a TeleHealth encounter, evaluation of the following organ systems is limited: Vitals/General/Skin/HEENT/Lungs/Heart/Abdomen/Genitourinary/Musculoskeletal/Neurologic. Wt Readings from Last 2 Encounters:   04/01/21 260 lb (117.9 kg)   03/01/21 250 lb (113.4 kg)       Patient-Reported Vitals 4/1/2021   Patient-Reported Weight 260 lb   Patient-Reported Height 5' 9\"   Patient-Reported Systolic 434   Patient-Reported Diastolic 95   Patient-Reported Pulse 95   Patient-Reported Temperature 97.9     Body mass index is 38.4 kg/m². General: This is a 22 y.o.  female who is alert and oriented to person, place and time. She appears to be her stated age and does not appear to be in any acute distress. She was able to follow commands. Affect appropriate for the situation. There were no hallucinations. Skin: Skin color, texture, turgor appears normal. No apparent rashes or lesions. HEENT/Neck: Head: Normal, normocephalic, atraumatic  Eye: Normal appearing external eye, conjunctiva, lids cornea. EOM appear intact bilaterally. Ears: Normal appearing external ears. Nose: Normal appearing external nose. No discharge. Pharynx: Mucous membranes appear moist.  Neck: No masses visualized. Pulmonary/Chest: Chest rises and falls symmetrically with inspiration and expiration. No accessory muscle use noted. Normal respiratory effort. No signs of difficulty breathing or respiratory distress visualized. Abdomen: Obese  Musculoskeletal: Normal appearing range of motion of neck. Gait appears normal with no signs of ataxia. Extremities: Within the limitations of TeleHealth examination there does not appear to be any clubbing, cyanosis, or edema in any of the extremities. Neurologic: No gaze palsy. No facial asymmetry (Cranial Nerve 7 motor function). Exam limited due to video visit. Osteopathic Structural Examination: Unable to perform due to limitations of Telehealth. ASSESSMENT/PLAN:    1. Panic disorder, improving  2.  PTSD (post-traumatic stress disorder), improving  3. Bipolar disease, chronic ([facility] [address] stable  - Her anxiety is much better since increasing the Buspar last time. We will keep her at the current dose  - She will continue with her other psychiatric medicines as well. The next time we refill her Xanax we will reduce the amount she is getting from 90 to 60 for a month's supply. No orders of the defined types were placed in this encounter. Requested Prescriptions     Signed Prescriptions Disp Refills    tiZANidine (ZANAFLEX) 4 MG tablet 90 tablet 5     Sig: Take 1 tablet by mouth every 8 hours as needed (muscle spasm)       Medications as ordered above. Return in about 3 months (around 7/1/2021). Pursuant to the emergency declaration under the [address] [place] [address], [address] [place] [address] authority and the [employer] and [employer] Act, this TeleHealth visit was conducted, with patient's consent, to reduce the patient's risk of exposure to COVID-19 and provide continuity of care for an established patient. Services were provided through a video synchronous discussion virtually (using doxy. me) to substitute for in-person clinic visit. The originating site was the patient's home and the distant site was the provider's office. The patient was physically present in a state where the provider has credentials to practice medicine. Patient's identity was verified via name and date of birth.           Electronically signed by Shahab Sin DO on 4/1/2021 at 1:27 PM  Internal Medicine

## 2021-04-26 DIAGNOSIS — F43.10 PTSD (POST-TRAUMATIC STRESS DISORDER): ICD-10-CM

## 2021-04-26 DIAGNOSIS — F41.0 PANIC DISORDER: ICD-10-CM

## 2021-04-26 NOTE — TELEPHONE ENCOUNTER
Pharmacy requesting a refill of the below medication which has been pended for you:     Requested Prescriptions     Pending Prescriptions Disp Refills    ALPRAZolam (XANAX) 1 MG tablet [Pharmacy Med Name: ALPRAZOLAM 1 MG TABLET] 90 tablet      Sig: take 1 tablet by mouth three times a day if needed       Last Appointment Date: 4/1/2021  Next Appointment Date: 7/1/2021    Allergies   Allergen Reactions    Codeine Anaphylaxis     Happened when in ER getting arm reset at age 9  States did have to be tubed    Tape Liliane Rock Tape] Other (See Comments)     Paper tape causes blisters

## 2021-04-27 RX ORDER — ALPRAZOLAM 1 MG/1
TABLET ORAL
Qty: 90 TABLET | Refills: 0 | Status: SHIPPED | OUTPATIENT
Start: 2021-04-27 | End: 2021-05-25

## 2021-06-23 DIAGNOSIS — F41.0 PANIC DISORDER: ICD-10-CM

## 2021-06-23 DIAGNOSIS — F43.10 PTSD (POST-TRAUMATIC STRESS DISORDER): ICD-10-CM

## 2021-06-23 RX ORDER — ALPRAZOLAM 1 MG/1
TABLET ORAL
Qty: 90 TABLET | Refills: 0 | Status: SHIPPED | OUTPATIENT
Start: 2021-06-23 | End: 2021-07-20

## 2021-06-23 RX ORDER — ALBUTEROL SULFATE 90 UG/1
AEROSOL, METERED RESPIRATORY (INHALATION)
Qty: 18 G | Refills: 11 | Status: SHIPPED | OUTPATIENT
Start: 2021-06-23 | End: 2022-08-18

## 2021-06-23 NOTE — TELEPHONE ENCOUNTER
Pharmacy requesting a refill of the below medication which has been pended for you:     Requested Prescriptions     Pending Prescriptions Disp Refills    ALPRAZolam (XANAX) 1 MG tablet 90 tablet 0     Sig: take 1 tablet by mouth three times a day if needed       Last Appointment Date: 4/1/2021  Next Appointment Date: 7/1/2021    Allergies   Allergen Reactions    Codeine Anaphylaxis     Happened when in ER getting arm reset at age 9  States did have to be tubed    Tape Kyler Paredes Tape] Other (See Comments)     Paper tape causes blisters Subjective:       Patient ID: Rocco Mchugh is a 34 y.o. male.    Chief Complaint: ER follow up for Hernia.     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 Patient denies any preceding heavy lifting that may have precipitated a hernia to occur. Patient was evaluated in the ER on 10/16/18 and diagnosed with an inguinal hernia. Patient continues to deny any constipation, diarrhea, is passing flatus. Patient denies any associated fevers, chills, n/v, abdominal pain or distention. Incidentally per record review, patient had CT scan of the chest that showed to RUL pulmonary nodules 7mm and 2mm. Patient was aware of this in 2016, however failed to follow this up. In addition, patient admits to 30 pounds of unintentional weight loss for the last year.  Pmhx: denies  Allergies: denies  Meds: denies  Fhx: HTN and DM2 - father. Mother - ovarian  Cancer   SHx: Denies EToH. Smoked 1 pack per day x  15 years. Vaccines up to date.      Review of Systems   Constitutional: Positive for appetite change and unexpected weight change. Negative for chills, fatigue and fever.   HENT: Negative for sore throat.    Eyes: Negative for visual disturbance.   Respiratory: Negative for shortness of breath.    Cardiovascular: Negative for chest pain.   Gastrointestinal: Negative for abdominal pain.   Endocrine: Negative for polyuria.   Genitourinary: Negative for flank pain.   Musculoskeletal: Negative for arthralgias.   Skin: Negative for color change.   Allergic/Immunologic: Positive for environmental allergies.   Neurological: Negative for tremors, weakness and headaches.   Hematological: Negative for adenopathy.   Psychiatric/Behavioral: Negative for agitation.       Objective:      Vitals:    10/19/18 1103   BP: 107/75   Pulse: (!) 57     Physical Exam   Constitutional: He is oriented to person, place, and time. He appears well-developed and  well-nourished.   HENT:   Head: Normocephalic and atraumatic.   Eyes: Conjunctivae and EOM are normal. Pupils are equal, round, and reactive to light.   Neck: Normal range of motion. Neck supple.   Cardiovascular: Normal rate, regular rhythm and normal heart sounds.   Pulmonary/Chest: Effort normal and breath sounds normal.   Abdominal: Soft. Bowel sounds are normal.   3cm right inguinal hernia noted. Reducible. Protruding into the right scrotum through the inguinal ring upon prompted coughing.    Musculoskeletal: Normal range of motion.   Neurological: He is alert and oriented to person, place, and time.   Skin: Skin is warm. Capillary refill takes 2 to 3 seconds.   Psychiatric: He has a normal mood and affect.       Assessment:       1. Pulmonary nodules    2. Reducible right inguinal hernia        Plan:       Pulmonary nodules        -     CTA chest completed in 2016 showed two pulmonary nodules in 2016 that was 7mm and 2mm in RUL.   -     CT Chest With Contrast; Future; Expected date: 10/19/2018    Reducible right inguinal hernia        -     Patient with minimal symptoms. No obstruction. No emergent evaluation indicated at this time.  -     Ambulatory referral to General Surgery      Follow-up in about 1 month (around 11/19/2018) for Pulmonary nodules evaluation .      Samuel Flores MD  Family Medicine, PGY-1

## 2021-06-24 NOTE — TELEPHONE ENCOUNTER
[place]AR[place] checked today    Controlled Substance Monitoring:    Acute and Chronic Pain Monitoring:   RX Monitoring 6/23/2021   Attestation -   Periodic Controlled Substance Monitoring No signs of potential drug abuse or diversion identified.

## 2021-07-20 DIAGNOSIS — F41.0 PANIC DISORDER: ICD-10-CM

## 2021-07-20 DIAGNOSIS — F43.10 PTSD (POST-TRAUMATIC STRESS DISORDER): ICD-10-CM

## 2021-07-20 RX ORDER — ALPRAZOLAM 1 MG/1
TABLET ORAL
Qty: 90 TABLET | Refills: 0 | Status: SHIPPED | OUTPATIENT
Start: 2021-07-20 | End: 2021-08-20

## 2021-08-24 DIAGNOSIS — F41.0 PANIC DISORDER: ICD-10-CM

## 2021-08-24 DIAGNOSIS — F43.10 PTSD (POST-TRAUMATIC STRESS DISORDER): ICD-10-CM

## 2021-08-25 RX ORDER — ALPRAZOLAM 1 MG/1
TABLET ORAL
Qty: 90 TABLET | Refills: 0 | Status: SHIPPED | OUTPATIENT
Start: 2021-08-25 | End: 2021-09-22

## 2021-08-26 NOTE — TELEPHONE ENCOUNTER
OAR[place] checked today    Controlled Substance Monitoring:    Acute and Chronic Pain Monitoring:   RX Monitoring 8/25/2021   Attestation -   Periodic Controlled Substance Monitoring No signs of potential drug abuse or diversion identified.

## 2021-09-20 RX ORDER — NAPROXEN 500 MG/1
500 TABLET ORAL 2 TIMES DAILY WITH MEALS
Qty: 30 TABLET | Refills: 11 | Status: SHIPPED | OUTPATIENT
Start: 2021-09-20 | End: 2022-05-26 | Stop reason: SDUPTHER

## 2021-09-21 DIAGNOSIS — F43.10 PTSD (POST-TRAUMATIC STRESS DISORDER): ICD-10-CM

## 2021-09-21 DIAGNOSIS — F41.0 PANIC DISORDER: ICD-10-CM

## 2021-09-22 RX ORDER — ALPRAZOLAM 1 MG/1
TABLET ORAL
Qty: 90 TABLET | Refills: 0 | Status: SHIPPED | OUTPATIENT
Start: 2021-09-22 | End: 2021-10-25

## 2021-09-22 NOTE — TELEPHONE ENCOUNTER
Jennifer called requesting a refill of the below medication which has been pended for you:     Requested Prescriptions     Pending Prescriptions Disp Refills    ALPRAZolam (XANAX) 1 MG tablet [Pharmacy Med Name: ALPRAZOLAM 1 MG TABLET] 90 tablet      Sig: take 1 tablet by mouth three times a day if needed       Last Appointment Date: 4/1/2021  Next Appointment Date: Visit date not found    Allergies   Allergen Reactions    Codeine Anaphylaxis     Happened when in ER getting arm reset at age 9  States did have to be tubed    Tape Rosaura Carlton Tape] Other (See Comments)     Paper tape causes blisters

## 2021-09-27 ENCOUNTER — VIRTUAL VISIT (OUTPATIENT)
Dept: INTERNAL MEDICINE | Age: 26
End: 2021-09-27
Payer: MEDICARE

## 2021-09-27 VITALS — BODY MASS INDEX: 34.96 KG/M2 | WEIGHT: 236 LBS | HEIGHT: 69 IN

## 2021-09-27 DIAGNOSIS — L65.9 HAIR LOSS: Primary | ICD-10-CM

## 2021-09-27 DIAGNOSIS — R22.1 NECK SWELLING: ICD-10-CM

## 2021-09-27 DIAGNOSIS — R53.83 OTHER FATIGUE: ICD-10-CM

## 2021-09-27 DIAGNOSIS — Z20.822 EXPOSURE TO CONFIRMED CASE OF COVID-19: ICD-10-CM

## 2021-09-27 PROCEDURE — G8427 DOCREV CUR MEDS BY ELIG CLIN: HCPCS | Performed by: INTERNAL MEDICINE

## 2021-09-27 PROCEDURE — 99211 OFF/OP EST MAY X REQ PHY/QHP: CPT | Performed by: INTERNAL MEDICINE

## 2021-09-27 PROCEDURE — 99214 OFFICE O/P EST MOD 30 MIN: CPT | Performed by: INTERNAL MEDICINE

## 2021-09-27 SDOH — ECONOMIC STABILITY: FOOD INSECURITY: WITHIN THE PAST 12 MONTHS, YOU WORRIED THAT YOUR FOOD WOULD RUN OUT BEFORE YOU GOT MONEY TO BUY MORE.: NEVER TRUE

## 2021-09-27 SDOH — ECONOMIC STABILITY: FOOD INSECURITY: WITHIN THE PAST 12 MONTHS, THE FOOD YOU BOUGHT JUST DIDN'T LAST AND YOU DIDN'T HAVE MONEY TO GET MORE.: NEVER TRUE

## 2021-09-27 ASSESSMENT — SOCIAL DETERMINANTS OF HEALTH (SDOH): HOW HARD IS IT FOR YOU TO PAY FOR THE VERY BASICS LIKE FOOD, HOUSING, MEDICAL CARE, AND HEATING?: NOT HARD AT ALL

## 2021-09-27 NOTE — PROGRESS NOTES
DR. Merlinda Iha - TELEHEALTH PROGRESS NOTE    CHIEF COMPLAINT/HISTORY OF CHIEF COMPLAINT: This 32 y.o.  female presents via TeleHealth visit today with concerns that she could have a problem with her thyroid gland. She has been having problems with her hair falling out for about a month. It is getting worse now. Also she feels tired all the time (before she used to have \"tons of energy\"). She just noticed some swelling in her neck about a week ago as well. She has been running a \"low grade fever\" of about 100.2 to 100.5 degrees F in the afternoons for the last several weeks as well. As thyroid issues run in her family she would like to get tested for them. Also, last week, on 9/24/21 she spent time in close contact with a coworker who tested positive for COVID-19. Both she and her coworker are unvaccinated. She is not showing any symptoms at this time and would like to know what steps she needs to take. There are no other complaints.       ALLERGIES/INTOLERANCES:   Allergies   Allergen Reactions    Codeine Anaphylaxis     Happened when in ER getting arm reset at age 9  States did have to be tubed    Tape [facility] Tape] Other (See Comments)     Paper tape causes blisters       MEDICATIONS:   Outpatient Medications Marked as Taking for the 9/27/21 encounter (Virtual Visit) with Danie Cobb, DO   Medication Sig Dispense Refill    ALPRAZolam (XANAX) 1 MG tablet take 1 tablet by mouth three times a day if needed 90 tablet 0    naproxen (NAPROSYN) 500 MG tablet Take 1 tablet by mouth 2 times daily (with meals) 30 tablet 11    albuterol sulfate  (90 Base) MCG/ACT inhaler inhale 1 to 2 puffs by mouth every 6 hours if needed 18 g 11    tiZANidine (ZANAFLEX) 4 MG tablet Take 1 tablet by mouth every 8 hours as needed (muscle spasm) 90 tablet 5    busPIRone (BUSPAR) 15 MG tablet Take 15 mg by mouth 3 times daily 90 tablet 11       IMMUNIZATIONS: Reviewed for influenza and pneumococcal status discussion virtually (using doxy. me) to substitute for in-person clinic visit. The originating site was the patient's home and the distant site was the provider's office. The patient was physically present in a state where the provider has credentials to practice medicine. Patient's identity was verified via name and date of birth.          Electronically signed by Linda Bhakta DO on 9/27/2021 at 1:58 PM  Internal Medicine

## 2021-10-04 ENCOUNTER — TELEPHONE (OUTPATIENT)
Dept: INTERNAL MEDICINE | Age: 26
End: 2021-10-04

## 2021-10-04 DIAGNOSIS — Z20.822 SUSPECTED COVID-19 VIRUS INFECTION: Primary | ICD-10-CM

## 2021-10-04 NOTE — TELEPHONE ENCOUNTER
Patient was seen last week. She now has low grade fever and is coughing horribly. She wants to be tested for COVID. States you already have her on quarantine but she will need a new letter for off work, as well.   Call her back at [phone]

## 2021-10-05 ENCOUNTER — HOSPITAL ENCOUNTER (OUTPATIENT)
Age: 26
Setting detail: SPECIMEN
Discharge: HOME | End: 2021-10-05
Payer: MEDICARE

## 2021-10-05 DIAGNOSIS — Z20.822 SUSPECTED COVID-19 VIRUS INFECTION: ICD-10-CM

## 2021-10-05 PROCEDURE — U0003 INFECTIOUS AGENT DETECTION BY NUCLEIC ACID (DNA OR RNA); SEVERE ACUTE RESPIRATORY SYNDROME CORONAVIRUS 2 (SARS-COV-2) (CORONAVIRUS DISEASE [COVID-19]), AMPLIFIED PROBE TECHNIQUE, MAKING USE OF HIGH THROUGHPUT TECHNOLOGIES AS DESCRIBED BY CMS-2020-01-R: HCPCS

## 2021-10-05 PROCEDURE — U0005 INFEC AGEN DETEC AMPLI PROBE: HCPCS

## 2021-10-07 LAB
SARS-COV-2: NORMAL
SARS-COV-2: NOT DETECTED
SOURCE: NORMAL

## 2021-10-12 ENCOUNTER — HOSPITAL ENCOUNTER (OUTPATIENT)
Dept: ULTRASOUND IMAGING | Age: 26
Discharge: HOME | End: 2021-10-14
Payer: MEDICARE

## 2021-10-12 ENCOUNTER — HOSPITAL ENCOUNTER (OUTPATIENT)
Dept: LAB | Age: 26
Discharge: HOME | End: 2021-10-12
Payer: MEDICARE

## 2021-10-12 DIAGNOSIS — L65.9 HAIR LOSS: ICD-10-CM

## 2021-10-12 DIAGNOSIS — R53.83 OTHER FATIGUE: ICD-10-CM

## 2021-10-12 DIAGNOSIS — R22.1 NECK SWELLING: ICD-10-CM

## 2021-10-12 LAB
THYROXINE, FREE: 1.01 NG/DL (ref 0.93–1.7)
TSH SERPL DL<=0.05 MIU/L-ACNC: 0.76 MIU/L (ref 0.3–5)

## 2021-10-12 PROCEDURE — 84439 ASSAY OF FREE THYROXINE: CPT

## 2021-10-12 PROCEDURE — 84443 ASSAY THYROID STIM HORMONE: CPT

## 2021-10-12 PROCEDURE — 86800 THYROGLOBULIN ANTIBODY: CPT

## 2021-10-12 PROCEDURE — 76536 US EXAM OF HEAD AND NECK: CPT

## 2021-10-12 PROCEDURE — 86376 MICROSOMAL ANTIBODY EACH: CPT

## 2021-10-12 PROCEDURE — 36415 COLL VENOUS BLD VENIPUNCTURE: CPT

## 2021-10-15 LAB
THYROGLOBULIN AB: 13 IU/ML (ref 0–40)
THYROID PEROXIDASE (TPO) AB: <4 IU/ML (ref 0–25)

## 2021-10-24 DIAGNOSIS — F43.10 PTSD (POST-TRAUMATIC STRESS DISORDER): ICD-10-CM

## 2021-10-24 DIAGNOSIS — F41.0 PANIC DISORDER: ICD-10-CM

## 2021-10-25 RX ORDER — ALPRAZOLAM 1 MG/1
TABLET ORAL
Qty: 90 TABLET | Refills: 0 | Status: SHIPPED | OUTPATIENT
Start: 2021-10-25 | End: 2021-11-18

## 2021-10-25 NOTE — TELEPHONE ENCOUNTER
Pharmacy requesting a refill of the below medication which has been pended for you:     Requested Prescriptions     Pending Prescriptions Disp Refills    ALPRAZolam (XANAX) 1 MG tablet [Pharmacy Med Name: ALPRAZOLAM 1 MG TABLET] 90 tablet      Sig: take 1 tablet by mouth three times a day if needed       Last Appointment Date: 9/27/2021  Next Appointment Date: 11/8/2021    Allergies   Allergen Reactions    Codeine Anaphylaxis     Happened when in ER getting arm reset at age 9  States did have to be tubed    Tape Levell Montana Tape] Other (See Comments)     Paper tape causes blisters

## 2021-11-18 DIAGNOSIS — F41.0 PANIC DISORDER: ICD-10-CM

## 2021-11-18 DIAGNOSIS — F43.10 PTSD (POST-TRAUMATIC STRESS DISORDER): ICD-10-CM

## 2021-11-18 RX ORDER — ALPRAZOLAM 1 MG/1
TABLET ORAL
Qty: 90 TABLET | Refills: 0 | Status: SHIPPED | OUTPATIENT
Start: 2021-11-18 | End: 2021-12-20

## 2021-11-18 NOTE — TELEPHONE ENCOUNTER
Pharmacy requesting a refill of the below medication which has been pended for you:     Requested Prescriptions     Pending Prescriptions Disp Refills    ALPRAZolam (XANAX) 1 MG tablet [Pharmacy Med Name: ALPRAZOLAM 1 MG TABLET] 90 tablet      Sig: take 1 tablet by mouth three times a day if needed       Last Appointment Date: 9/27/2021  Next Appointment Date: Visit date not found    Allergies   Allergen Reactions    Codeine Anaphylaxis     Happened when in ER getting arm reset at age 9  States did have to be tubed    Tape Sarahcharlie Bridges Tape] Other (See Comments)     Paper tape causes blisters

## 2021-12-19 DIAGNOSIS — F43.10 PTSD (POST-TRAUMATIC STRESS DISORDER): ICD-10-CM

## 2021-12-19 DIAGNOSIS — F41.0 PANIC DISORDER: ICD-10-CM

## 2021-12-20 RX ORDER — ALPRAZOLAM 1 MG/1
TABLET ORAL
Qty: 90 TABLET | Refills: 0 | Status: SHIPPED | OUTPATIENT
Start: 2021-12-20 | End: 2022-01-19

## 2021-12-20 NOTE — TELEPHONE ENCOUNTER
Pharmacy requesting a refill of the below medication which has been pended for you:     Requested Prescriptions     Pending Prescriptions Disp Refills    ALPRAZolam (XANAX) 1 MG tablet [Pharmacy Med Name: ALPRAZOLAM 1 MG TABLET] 90 tablet 0     Sig: take 1 tablet by mouth three times a day if needed       Last Appointment Date: 9/27/2021  Next Appointment Date: Visit date not found    Allergies   Allergen Reactions    Codeine Anaphylaxis     Happened when in ER getting arm reset at age 9  States did have to be tubed    Tape Clemetine Germán Tape] Other (See Comments)     Paper tape causes blisters

## 2022-01-17 RX ORDER — TIZANIDINE 4 MG/1
TABLET ORAL
Qty: 90 TABLET | Refills: 5 | Status: SHIPPED | OUTPATIENT
Start: 2022-01-17 | End: 2022-02-02

## 2022-01-17 NOTE — TELEPHONE ENCOUNTER
Pharmacy requesting a refill of the below medication which has been pended for you:     Requested Prescriptions     Pending Prescriptions Disp Refills    tiZANidine (ZANAFLEX) 4 MG tablet [Pharmacy Med Name: TIZANIDINE HCL 4 MG TABLET] 90 tablet 5     Sig: take 1 tablet by mouth every 8 hours if needed for muscle spasm       Last Appointment Date: 9/27/2021  Next Appointment Date: Visit date not found    Allergies   Allergen Reactions    Codeine Anaphylaxis     Happened when in ER getting arm reset at age 9  States did have to be tubed    Tape Refugio Palm Tape] Other (See Comments)     Paper tape causes blisters

## 2022-01-18 DIAGNOSIS — F43.10 PTSD (POST-TRAUMATIC STRESS DISORDER): ICD-10-CM

## 2022-01-18 DIAGNOSIS — F41.0 PANIC DISORDER: ICD-10-CM

## 2022-01-19 RX ORDER — ALPRAZOLAM 1 MG/1
TABLET ORAL
Qty: 90 TABLET | Refills: 0 | Status: SHIPPED | OUTPATIENT
Start: 2022-01-19 | End: 2022-02-21

## 2022-01-19 NOTE — TELEPHONE ENCOUNTER
Pharmacy requesting a refill of the below medication which has been pended for you:     Requested Prescriptions     Pending Prescriptions Disp Refills    ALPRAZolam (XANAX) 1 MG tablet [Pharmacy Med Name: ALPRAZOLAM 1 MG TABLET] 90 tablet 0     Sig: take 1 tablet by mouth three times a day if needed       Last Appointment Date: 9/27/2021  Next Appointment Date: Visit date not found    Allergies   Allergen Reactions    Codeine Anaphylaxis     Happened when in ER getting arm reset at age 9  States did have to be tubed    Tape Delona Keri Tape] Other (See Comments)     Paper tape causes blisters

## 2022-02-02 RX ORDER — TIZANIDINE 4 MG/1
TABLET ORAL
Qty: 90 TABLET | Refills: 5 | Status: SHIPPED | OUTPATIENT
Start: 2022-02-02 | End: 2022-10-27

## 2022-02-02 NOTE — TELEPHONE ENCOUNTER
Pharmacy requesting a refill of the below medication which has been pended for you:     Requested Prescriptions     Pending Prescriptions Disp Refills    tiZANidine (ZANAFLEX) 4 MG tablet [Pharmacy Med Name: TIZANIDINE HCL 4 MG TABLET] 90 tablet 5     Sig: take 1 tablet by mouth every 8 hours if needed for muscle spasm       Last Appointment Date: 9/27/2021  Next Appointment Date: Visit date not found    Allergies   Allergen Reactions    Codeine Anaphylaxis     Happened when in ER getting arm reset at age 9  States did have to be tubed    Tape James Artemio Tape] Other (See Comments)     Paper tape causes blisters

## 2022-02-18 DIAGNOSIS — F43.10 PTSD (POST-TRAUMATIC STRESS DISORDER): ICD-10-CM

## 2022-02-18 DIAGNOSIS — F41.0 PANIC DISORDER: ICD-10-CM

## 2022-02-21 RX ORDER — ALPRAZOLAM 1 MG/1
TABLET ORAL
Qty: 90 TABLET | Refills: 0 | Status: SHIPPED | OUTPATIENT
Start: 2022-02-21 | End: 2022-03-21

## 2022-02-21 NOTE — TELEPHONE ENCOUNTER
[place]AR[place] checked today    Controlled Substance Monitoring:    Acute and Chronic Pain Monitoring:   RX Monitoring 2/21/2022   Attestation -   Periodic Controlled Substance Monitoring No signs of potential drug abuse or diversion identified.

## 2022-03-20 DIAGNOSIS — F43.10 PTSD (POST-TRAUMATIC STRESS DISORDER): ICD-10-CM

## 2022-03-20 DIAGNOSIS — F41.0 PANIC DISORDER: ICD-10-CM

## 2022-03-21 RX ORDER — ALPRAZOLAM 1 MG/1
TABLET ORAL
Qty: 90 TABLET | Refills: 0 | Status: SHIPPED | OUTPATIENT
Start: 2022-03-21 | End: 2022-04-27

## 2022-03-21 NOTE — TELEPHONE ENCOUNTER
Pharmacy requesting a refill of the below medication which has been pended for you:     Requested Prescriptions     Pending Prescriptions Disp Refills    ALPRAZolam (XANAX) 1 MG tablet [Pharmacy Med Name: ALPRAZOLAM 1 MG TABLET] 90 tablet 0     Sig: take 1 tablet by mouth three times a day if needed       Last Appointment Date: 9/27/2021  Next Appointment Date: Visit date not found    Allergies   Allergen Reactions    Codeine Anaphylaxis     Happened when in ER getting arm reset at age 9  States did have to be tubed    Tape Moshe Santosh Tape] Other (See Comments)     Paper tape causes blisters

## 2022-04-27 DIAGNOSIS — F43.10 PTSD (POST-TRAUMATIC STRESS DISORDER): ICD-10-CM

## 2022-04-27 DIAGNOSIS — F41.0 PANIC DISORDER: ICD-10-CM

## 2022-04-27 RX ORDER — ALPRAZOLAM 1 MG/1
TABLET ORAL
Qty: 90 TABLET | Refills: 0 | Status: SHIPPED | OUTPATIENT
Start: 2022-04-27 | End: 2022-05-19

## 2022-05-19 DIAGNOSIS — F43.10 PTSD (POST-TRAUMATIC STRESS DISORDER): ICD-10-CM

## 2022-05-19 DIAGNOSIS — F41.0 PANIC DISORDER: ICD-10-CM

## 2022-05-19 RX ORDER — ALPRAZOLAM 1 MG/1
TABLET ORAL
Qty: 90 TABLET | Refills: 0 | Status: SHIPPED | OUTPATIENT
Start: 2022-05-19 | End: 2022-06-21

## 2022-05-26 RX ORDER — NAPROXEN 500 MG/1
500 TABLET ORAL 2 TIMES DAILY WITH MEALS
Qty: 30 TABLET | Refills: 11 | Status: SHIPPED | OUTPATIENT
Start: 2022-05-26

## 2022-05-26 NOTE — TELEPHONE ENCOUNTER
Refill request     Medication pended if agreeable    Last Appt:  9/27/2021  Next Appt:   Visit date not found  Med verified in [place]

## 2022-06-13 RX ORDER — BUSPIRONE HYDROCHLORIDE 15 MG/1
TABLET ORAL
Qty: 90 TABLET | Refills: 5 | Status: SHIPPED | OUTPATIENT
Start: 2022-06-13

## 2022-06-21 DIAGNOSIS — F41.0 PANIC DISORDER: ICD-10-CM

## 2022-06-21 DIAGNOSIS — F43.10 PTSD (POST-TRAUMATIC STRESS DISORDER): ICD-10-CM

## 2022-06-21 RX ORDER — ALPRAZOLAM 1 MG/1
TABLET ORAL
Qty: 90 TABLET | Refills: 0 | Status: SHIPPED | OUTPATIENT
Start: 2022-06-21 | End: 2022-07-29 | Stop reason: SDUPTHER

## 2022-07-18 DIAGNOSIS — F43.10 PTSD (POST-TRAUMATIC STRESS DISORDER): ICD-10-CM

## 2022-07-18 DIAGNOSIS — F41.0 PANIC DISORDER: ICD-10-CM

## 2022-07-19 RX ORDER — ALPRAZOLAM 1 MG/1
TABLET ORAL
Qty: 90 TABLET | OUTPATIENT
Start: 2022-07-19

## 2022-07-29 ENCOUNTER — OFFICE VISIT (OUTPATIENT)
Dept: INTERNAL MEDICINE | Age: 27
End: 2022-07-29
Payer: MEDICARE

## 2022-07-29 ENCOUNTER — HOSPITAL ENCOUNTER (OUTPATIENT)
Dept: LAB | Age: 27
Discharge: HOME | End: 2022-07-29
Payer: MEDICARE

## 2022-07-29 VITALS
DIASTOLIC BLOOD PRESSURE: 70 MMHG | OXYGEN SATURATION: 99 % | SYSTOLIC BLOOD PRESSURE: 110 MMHG | WEIGHT: 204 LBS | HEART RATE: 95 BPM | HEIGHT: 69 IN | RESPIRATION RATE: 16 BRPM | BODY MASS INDEX: 30.21 KG/M2

## 2022-07-29 DIAGNOSIS — F41.0 PANIC DISORDER: Primary | ICD-10-CM

## 2022-07-29 DIAGNOSIS — Z72.0 TOBACCO USE: ICD-10-CM

## 2022-07-29 DIAGNOSIS — F43.10 PTSD (POST-TRAUMATIC STRESS DISORDER): ICD-10-CM

## 2022-07-29 DIAGNOSIS — E66.9 CLASS 1 OBESITY WITHOUT SERIOUS COMORBIDITY WITH BODY MASS INDEX (BMI) OF 30.0 TO 30.9 IN ADULT, UNSPECIFIED OBESITY TYPE: ICD-10-CM

## 2022-07-29 DIAGNOSIS — R63.4 UNEXPLAINED WEIGHT LOSS: ICD-10-CM

## 2022-07-29 DIAGNOSIS — F17.210 CIGARETTE NICOTINE DEPENDENCE WITHOUT COMPLICATION: ICD-10-CM

## 2022-07-29 DIAGNOSIS — F31.9 BIPOLAR DISEASE, CHRONIC (HCC): ICD-10-CM

## 2022-07-29 LAB
ABSOLUTE EOS #: 0.13 K/UL (ref 0–0.44)
ABSOLUTE IMMATURE GRANULOCYTE: <0.03 K/UL (ref 0–0.3)
ABSOLUTE LYMPH #: 2.61 K/UL (ref 1.1–3.7)
ABSOLUTE MONO #: 0.6 K/UL (ref 0.1–1.2)
ALBUMIN SERPL-MCNC: 4.7 G/DL (ref 3.5–5.2)
ALBUMIN/GLOBULIN RATIO: 1.7 (ref 1–2.5)
ALP BLD-CCNC: 61 U/L (ref 35–104)
ALT SERPL-CCNC: 12 U/L (ref 5–33)
ANION GAP SERPL CALCULATED.3IONS-SCNC: 11 MMOL/L (ref 9–17)
AST SERPL-CCNC: 13 U/L
BASOPHILS # BLD: 1 % (ref 0–2)
BASOPHILS ABSOLUTE: 0.05 K/UL (ref 0–0.2)
BILIRUB SERPL-MCNC: 0.33 MG/DL (ref 0.3–1.2)
BUN BLDV-MCNC: 11 MG/DL (ref 6–20)
BUN/CREAT BLD: 20 (ref 9–20)
CALCIUM SERPL-MCNC: 9.6 MG/DL (ref 8.6–10.4)
CHLORIDE BLD-SCNC: 105 MMOL/L (ref 98–107)
CO2: 26 MMOL/L (ref 20–31)
CREAT SERPL-MCNC: 0.54 MG/DL (ref 0.5–0.9)
EOSINOPHILS RELATIVE PERCENT: 2 % (ref 1–4)
GFR AFRICAN AMERICAN: >60 ML/MIN
GFR NON-AFRICAN AMERICAN: >60 ML/MIN
GFR SERPL CREATININE-BSD FRML MDRD: NORMAL ML/MIN/{1.73_M2}
GLUCOSE BLD-MCNC: 99 MG/DL (ref 70–99)
HCT VFR BLD CALC: 41 % (ref 36.3–47.1)
HEMOGLOBIN: 13.6 G/DL (ref 11.9–15.1)
IMMATURE GRANULOCYTES: 0 %
LYMPHOCYTES # BLD: 30 % (ref 24–43)
MCH RBC QN AUTO: 31.2 PG (ref 25.2–33.5)
MCHC RBC AUTO-ENTMCNC: 33.2 G/DL (ref 25.2–33.5)
MCV RBC AUTO: 94 FL (ref 82.6–102.9)
MONOCYTES # BLD: 7 % (ref 3–12)
NRBC AUTOMATED: 0 PER 100 WBC
PDW BLD-RTO: 12.5 % (ref 11.8–14.4)
PLATELET # BLD: 298 K/UL (ref 138–453)
PMV BLD AUTO: 11 FL (ref 8.1–13.5)
POTASSIUM SERPL-SCNC: 4.6 MMOL/L (ref 3.7–5.3)
RBC # BLD: 4.36 M/UL (ref 3.95–5.11)
SEG NEUTROPHILS: 60 % (ref 36–65)
SEGMENTED NEUTROPHILS ABSOLUTE COUNT: 5.37 K/UL (ref 1.5–8.1)
SODIUM BLD-SCNC: 142 MMOL/L (ref 135–144)
THYROXINE, FREE: 1.07 NG/DL (ref 0.93–1.7)
TOTAL PROTEIN: 7.4 G/DL (ref 6.4–8.3)
TSH SERPL DL<=0.05 MIU/L-ACNC: 1.02 UIU/ML (ref 0.3–5)
WBC # BLD: 8.8 K/UL (ref 3.5–11.3)

## 2022-07-29 PROCEDURE — 99214 OFFICE O/P EST MOD 30 MIN: CPT | Performed by: INTERNAL MEDICINE

## 2022-07-29 PROCEDURE — 85025 COMPLETE CBC W/AUTO DIFF WBC: CPT

## 2022-07-29 PROCEDURE — 99406 BEHAV CHNG SMOKING 3-10 MIN: CPT | Performed by: INTERNAL MEDICINE

## 2022-07-29 PROCEDURE — 84439 ASSAY OF FREE THYROXINE: CPT

## 2022-07-29 PROCEDURE — 80053 COMPREHEN METABOLIC PANEL: CPT

## 2022-07-29 PROCEDURE — G8427 DOCREV CUR MEDS BY ELIG CLIN: HCPCS | Performed by: INTERNAL MEDICINE

## 2022-07-29 PROCEDURE — 4004F PT TOBACCO SCREEN RCVD TLK: CPT | Performed by: INTERNAL MEDICINE

## 2022-07-29 PROCEDURE — 84443 ASSAY THYROID STIM HORMONE: CPT

## 2022-07-29 PROCEDURE — G8417 CALC BMI ABV UP PARAM F/U: HCPCS | Performed by: INTERNAL MEDICINE

## 2022-07-29 PROCEDURE — 36415 COLL VENOUS BLD VENIPUNCTURE: CPT

## 2022-07-29 RX ORDER — ALPRAZOLAM 1 MG/1
1 TABLET ORAL 3 TIMES DAILY PRN
COMMUNITY
End: 2022-07-29 | Stop reason: SDUPTHER

## 2022-07-29 RX ORDER — ALPRAZOLAM 1 MG/1
1 TABLET ORAL 3 TIMES DAILY PRN
Qty: 90 TABLET | Refills: 0 | Status: SHIPPED | OUTPATIENT
Start: 2022-07-29 | End: 2022-08-24

## 2022-07-29 ASSESSMENT — PATIENT HEALTH QUESTIONNAIRE - PHQ9
4. FEELING TIRED OR HAVING LITTLE ENERGY: 1
SUM OF ALL RESPONSES TO PHQ QUESTIONS 1-9: 3
3. TROUBLE FALLING OR STAYING ASLEEP: 0
SUM OF ALL RESPONSES TO PHQ QUESTIONS 1-9: 3
7. TROUBLE CONCENTRATING ON THINGS, SUCH AS READING THE NEWSPAPER OR WATCHING TELEVISION: 0
SUM OF ALL RESPONSES TO PHQ QUESTIONS 1-9: 3
10. IF YOU CHECKED OFF ANY PROBLEMS, HOW DIFFICULT HAVE THESE PROBLEMS MADE IT FOR YOU TO DO YOUR WORK, TAKE CARE OF THINGS AT HOME, OR GET ALONG WITH OTHER PEOPLE: 0
SUM OF ALL RESPONSES TO PHQ QUESTIONS 1-9: 3
9. THOUGHTS THAT YOU WOULD BE BETTER OFF DEAD, OR OF HURTING YOURSELF: 0
2. FEELING DOWN, DEPRESSED OR HOPELESS: 1
5. POOR APPETITE OR OVEREATING: 0
1. LITTLE INTEREST OR PLEASURE IN DOING THINGS: 1
SUM OF ALL RESPONSES TO PHQ9 QUESTIONS 1 & 2: 2
8. MOVING OR SPEAKING SO SLOWLY THAT OTHER PEOPLE COULD HAVE NOTICED. OR THE OPPOSITE, BEING SO FIGETY OR RESTLESS THAT YOU HAVE BEEN MOVING AROUND A LOT MORE THAN USUAL: 0
6. FEELING BAD ABOUT YOURSELF - OR THAT YOU ARE A FAILURE OR HAVE LET YOURSELF OR YOUR FAMILY DOWN: 0

## 2022-07-29 NOTE — PROGRESS NOTES
DR. Kristen Veloz - PROGRESS NOTE    CHIEF COMPLAINT/HISTORY OF CHIEF COMPLAINT: This 32 y.o.  female comes in today for ongoing evaluation and management of her panic disorder, post-traumatic stress disorder, Bipolar disorder, depression, and obesity. She has been under more stress lately over her son's health problems. She has lost a lot of weight without changing her eating habits. She would like to see a counselor but she doesn't know how she would be able to do it in person due to having to care for her children. She takes Buspar and uses Xanax for her anxiety and post-traumatic stress disorder. She uses naproxen and Zanaflex for chronic back pain. She is still smoking about 1/2 pack of cigarettes a day. ALLERGIES/INTOLERANCES:   Allergies   Allergen Reactions    Codeine Anaphylaxis     Happened when in ER getting arm reset at age 9  States did have to be tubed    Tape [Adhesive Tape] Other (See Comments)     Paper tape causes blisters       MEDICATIONS:   Outpatient Medications Marked as Taking for the 7/29/22 encounter (Office Visit) with Mendel Dove, DO   Medication Sig Dispense Refill    busPIRone (BUSPAR) 15 MG tablet take 1 tablet by mouth three times a day 90 tablet 5    naproxen (NAPROSYN) 500 MG tablet Take 1 tablet by mouth 2 times daily (with meals) 30 tablet 11    tiZANidine (ZANAFLEX) 4 MG tablet take 1 tablet by mouth every 8 hours if needed for muscle spasm 90 tablet 5    albuterol sulfate  (90 Base) MCG/ACT inhaler inhale 1 to 2 puffs by mouth every 6 hours if needed 18 g 11       IMMUNIZATIONS: Reviewed for influenza and pneumococcal status as indicated in electronic record.     REVIEW OF SYSTEMS:     General: negative for - chills, fever, or night sweats  Skin: negative for - pruritus or rash  Head: Negative for: headache or recent history of head trauma  Ear, Nose, Throat: negative for - epistaxis, nasal congestion, nasal discharge, sore throat, tinnitus, or vertigo  Cardiovascular: negative for - chest pain, dyspnea on exertion, or shortness of breath  Respiratory: negative for - cough, hemoptysis, or wheezing  Gastrointestinal: negative for - constipation, diarrhea, or nausea/vomiting  Genitourinary: negative for - dysuria, hematuria, or nocturia  Musculoskeletal: positive for - joint pain  negative for - muscle pain or muscular weakness  Neurologic: negative for - gait disturbance, numbness/tingling, seizures, or tremors  Psychiatric: positive for - anxiety and depression  negative for - suicidal ideation     PHYSICAL EXAMINATION:    Wt Readings from Last 2 Encounters:   07/29/22 204 lb (92.5 kg)   09/27/21 236 lb (107 kg)       Vitals:    07/29/22 1344   BP: 110/70   Site: Right Upper Arm   Position: Sitting   Cuff Size: Large Adult   Pulse: 95   Resp: 16   SpO2: 99%   Weight: 204 lb (92.5 kg)   Height: 5' 9\" (1.753 m)     Body mass index is 30.13 kg/m². General: This is a 32 y.o.  female who is alert and oriented to person, place, and time. She appears to be her stated age and does not appear to be in any acute distress. Skin: Skin color, texture, turgor normal. No rashes or lesions. HEENT/Neck: Head: Normal, normocephalic, atraumatic. Eye: Normal external eye, conjunctiva, lids cornea, NINA. Ears: Normal TM's bilaterally. Normal auditory canals and external ears. Non-tender. Nose: Normal external nose, mucus membranes and septum. Pharynx: Dental Hygiene adequate. Normal buccal mucosa. Normal pharynx. Neck / Thyroid: Supple, no masses, nodes, nodules or enlargement. Lungs: Normal - CTA without rales, rhonchi, or wheezing. Heart: regular rate and rhythm, S1, S2 normal, no murmur, click, rub or gallop No S3 or S4. Abdomen: Obese, soft, non-distended, non-tender, normal active bowel sounds, no masses palpated, and no hepatosplenomegaly  Extremities: There is no clubbing, cyanosis, or edema in any of the extremities.   Neurologic:  cranial nerves II-XII are grossly intact  Osteopathic Structural Examination: Patient was examined in the seated and standing positions. There was full range of motion in the cervical, thoracic, and lumbar spines. No scoliosis. No change in thoracic kyphosis or lumbar lordosis. No increased paravertebral muscle tenderness. ASSESSMENT/PLAN:    1. Panic disorder, stable  2. PTSD (post-traumatic stress disorder), stable  - We will continue to monitor  - We will reorder her Xanax  - We will refer her to Dr. Celia Bradley for counseling, since she does do Telehealth  - ALPRAZolam (XANAX) 1 MG tablet; Take 1 tablet by mouth 3 times daily as needed for Anxiety for up to 30 days. Dispense: 90 tablet; Refill: 0    3. Bipolar disease, chronic ([address] stable  - We will continue to monitor     4. Unexplained weight loss, new  - Her weight loss could potentially be explained by the stress that she has been under, but we will get blood work (outlined below) to make sure there isn't anything more serious going on.  - CBC with Auto Differential; Future  - Comprehensive Metabolic Panel; Future  - TSH; Future  - T4, Free; Future    5. Tobacco use  6. Cigarette nicotine dependence without complication  - She was advised to quit smoking immediately and completely. The risks of continued smoking were reviewed with her and she did verbalize understanding.  - Tobacco Cessation Counseling: Patient advised about behavior change, including information about personal health harms, usage of appropriate cessation measures and benefits of cessation.   Time spent (minutes): 5 minutes   - [place] TOBACCO USE CESSATION INTERMEDIATE 3-10 MINUTES    7. Class 1 obesity without serious comorbidity with body mass index (BMI) of 30.0 to 30.9 in adult, unspecified obesity type, improved  - We discussed weight loss  - She will continue to watch her diet and exercise      Orders Placed This Encounter   Procedures    CBC with Auto Differential     Standing Status:   Future Standing Expiration Date:   7/29/2023    Comprehensive Metabolic Panel     Standing Status:   Future     Standing Expiration Date:   7/29/2023    TSH     Standing Status:   Future     Standing Expiration Date:   7/29/2023    T4, Free     Standing Status:   Future     Standing Expiration Date:   7/29/2023    Isabel Small PsyD, [address] [place] [address], [place]     Referral Priority:   Routine     Referral Type:   Eval and Treat     Referral Reason:   Specialty Services Required     Referred to Provider:   Zulema Franz PSYD     Requested Specialty:   Behavioral Health     Number of Visits Requested:   1    [place] TOBACCO USE CESSATION INTERMEDIATE 3-10 MINUTES       Requested Prescriptions     Signed Prescriptions Disp Refills    ALPRAZolam (XANAX) 1 MG tablet 90 tablet 0     Sig: Take 1 tablet by mouth 3 times daily as needed for Anxiety for up to 30 days. Labs and medications as ordered above. Return in about 3 months (around 10/29/2022).         Electronically signed by Roula Lowe DO on 7/29/2022 at 2:57 PM  Internal Medicine

## 2022-08-18 NOTE — TELEPHONE ENCOUNTER
Pharmacy requesting a refill of the below medication which has been pended for you:     Requested Prescriptions     Pending Prescriptions Disp Refills    VENTOLIN  (90 Base) MCG/ACT inhaler [Pharmacy Med Name: VENTOLIN HFA 90 MCG INHALER] 18 g 11     Sig: inhale 1-2 puffs by mouth and INTO THE LUNGS every 6 hours if needed       Last Appointment Date: 7/29/2022  Next Appointment Date: 10/31/2022    Allergies   Allergen Reactions    Codeine Anaphylaxis     Happened when in ER getting arm reset at age 9  States did have to be tubed    Tape [Adhesive Tape] Other (See Comments)     Paper tape causes blisters

## 2022-08-23 DIAGNOSIS — F41.0 PANIC DISORDER: ICD-10-CM

## 2022-08-23 DIAGNOSIS — F43.10 PTSD (POST-TRAUMATIC STRESS DISORDER): ICD-10-CM

## 2022-08-24 RX ORDER — ALPRAZOLAM 1 MG/1
TABLET ORAL
Qty: 90 TABLET | Refills: 0 | Status: SHIPPED | OUTPATIENT
Start: 2022-08-24 | End: 2022-09-26

## 2022-08-24 NOTE — TELEPHONE ENCOUNTER
[place] today    Controlled Substance Monitoring:    Acute and Chronic Pain Monitoring:   RX Monitoring 8/24/2022   Attestation -   Periodic Controlled Substance Monitoring No signs of potential drug abuse or diversion identified.

## 2022-08-26 ENCOUNTER — TELEPHONE (OUTPATIENT)
Dept: INTERNAL MEDICINE | Age: 27
End: 2022-08-26

## 2022-08-26 NOTE — TELEPHONE ENCOUNTER
Her symptoms started last night. She though is was just allergies. She has been taking cough drops and nyquil.

## 2022-08-26 NOTE — TELEPHONE ENCOUNTER
[facility]  Staff  Subject: Message to Provider     QUESTIONS   Information for Provider? Patient tested positive for Covid test and would   like to know if she is able to get a prescrition like a Z-marilee to help with   her symptoms. [facility] [address]. She has nasal congestion. Please   call patient to let her know   ---------------------------------------------------------------------------   --------------   [id number]   [phone]; OK to leave message on voicemail   ---------------------------------------------------------------------------   --------------   SCRIPT ANSWERS   Relationship to Patient?  Self

## 2022-08-26 NOTE — TELEPHONE ENCOUNTER
Is This A New Presentation, Or A Follow-Up?: Skin Lesion Patient informed. Verbalized understanding. How Severe Is Your Skin Lesion?: mild Has Your Skin Lesion Been Treated?: not been treated

## 2022-08-26 NOTE — TELEPHONE ENCOUNTER
Prescription for Paxlovid sent to pharmacy. She should cut dose of Buspar in half while taking Paxlovid. Hold tizanidine while taking Paxlovid. When she is done with the paxlovid she can go back to normal doses of both medications.

## 2022-08-26 NOTE — TELEPHONE ENCOUNTER
----- Message from Carolee Tracy sent at 8/26/2022  9:36 AM EDT -----  Subject: Message to Provider    QUESTIONS  Information for Provider? Patient tested positive for Covid test and would   like to know if she is able to get a prescrition like a Z-marilee to help with   her symptoms. [facility] on [address]. She has nasal congestion. Please   call patient to let her know  ---------------------------------------------------------------------------  --------------  [id number] [employer] [place]  [phone]; OK to leave message on voicemail  ---------------------------------------------------------------------------  --------------  SCRIPT ANSWERS  Relationship to Patient?  Self

## 2022-08-26 NOTE — TELEPHONE ENCOUNTER
Called patient to find out what date she first started with symptoms in order to know what treatment to use. No answer. Left message for patient to call office.

## 2022-09-06 ENCOUNTER — OFFICE VISIT (OUTPATIENT)
Dept: BEHAVIORAL/MENTAL HEALTH | Age: 27
End: 2022-09-06
Payer: MEDICARE

## 2022-09-06 DIAGNOSIS — F41.0 PANIC DISORDER: Primary | ICD-10-CM

## 2022-09-06 DIAGNOSIS — F31.9 BIPOLAR DISEASE, CHRONIC (HCC): ICD-10-CM

## 2022-09-06 DIAGNOSIS — F43.10 PTSD (POST-TRAUMATIC STRESS DISORDER): ICD-10-CM

## 2022-09-06 PROCEDURE — 4004F PT TOBACCO SCREEN RCVD TLK: CPT | Performed by: COUNSELOR

## 2022-09-06 PROCEDURE — 90791 PSYCH DIAGNOSTIC EVALUATION: CPT | Performed by: COUNSELOR

## 2022-09-06 NOTE — PATIENT INSTRUCTIONS
1) Review the attached information on sleep hygiene. Go through the worksheet at the end of it; are there things that make sense to try right now? If so, do them. 2) Make sure to eat regularly. If you haven't eaten in 3-4 hours, grab at least a small snack, even if you have no appetite. If your stomach is talking to you, listen. 3) Aim for at least 30-40 cumulative minutes of movement each day. This doesn't have to be all at once, or a workout. Just stretch or dance to a song you like. If you've been sitting for more than an hour or two, get up and move around for a few minutes. 4) Review the attached list of coping skills. Try 1-2 a week; do more if you're feeling frisky! 5) Check out the attached info on grounding techniques. These can be used to disrupt the hamster when it gets overly lively. The relief will only be temporary, so make sure that you follow up with something relaxing and/or distracting to prolong the relief you may get. 6) Review the attached information on deep breathing and relaxation. Use this to help manage intense emotions, or just to relax. Practice this daily, even if you're not feeling particularly stressed. 7) Are there things that you've stopped doing because of stress or your mood? If so, make note of them and consider some ways to reincorporate them into your life. This is also something that can be discussed in counseling. 8) Remember to be kind to yourself. This is a challenging experience, and you're doing the best you can. Sleep Hygiene Guidelines    Good dental hygiene is important in determining the health of your teeth and gums. We all know we are supposed to brush and floss regularly. Those who do so are more likely to have strong, healthy gums and less cavities. Similarly, good sleep hygiene is important in determining the quality and quantity of your sleep. Below are guidelines for good sleep hygiene practices.   Review these guidelines and evaluate how well you practice good sleep hygiene. Caffeine:  Avoid Caffeine 6-8 Hours Before Bedtime       Caffeine disturbs sleep, even in people who do not think they experience a stimulation effect. Individuals with insomnia are often more sensitive to mild stimulants than are normal sleepers. Caffeine is found in items such as coffee, tea, soda, chocolate, and many over-the-counter medications (e.g., Excedrin). Thus, drinking caffeinated beverages should be avoided near bedtime and during the night. You might consider a trial period of no caffeine if you tend to be sensitive to its effects. Nicotine:  Avoid Nicotine Before Bedtime       Although some smokers claim that smoking helps them relax, but nicotine is a stimulant. The initial relaxing effects occur with the initial entry of the nicotine, but as the nicotine builds in the system it produces an effect similar to caffeine. Thus, smoking, dipping, or chewing tobacco should be avoided near bedtime and during the night. Dont smoke to get yourself back to sleep. Alcohol:  Avoid Alcohol After Dinner       Alcohol often promotes the onset of sleep, but as alcohol is metabolized sleep becomes disturbed and fragmented. Thus, a large amount of alcohol is a poor sleep aid and should not be used as such. Limit alcohol use to small quantities to moderate quantities. Sleeping Pills:  Sleep Medications are Effective Only Temporarily       Scientists have shown that sleep medications lose their effectiveness in about 2 - 4 weeks when taken regularly. Despite advertisements to the contrary, over-the-counter sleeping aids have little impact on sleep beyond the placebo effect. Over time, sleeping pills actually can make sleep problems worse. When sleeping pills have been used for a long period, withdrawal from the medication can lead to an insomnia rebound.   Thus, after long-term use, many individuals incorrectly conclude that they need sleeping pills in order to sleep normally. Keep use of sleep pills infrequent, but dont worry if you need t use one on an occasional basis. Regular Exercise       Get regular exercise, preferably 40 minutes each day of an activity that causes sweating. .  Exercise in the late afternoon or early evening seems to aid sleep, although the positive effect often takes several weeks to become noticeable. Exercising sporadically is not likely to improve sleep, and exercise within 2 hours of bedtime may elevate nervous system activity and interfere with sleep onset. Hot Baths  Spending 20 minutes in a tub of hot water an hour or two prior to bedtime may promote sleep and is strongly recommended. Bedroom Environment: Moderate Temperature, Quiet, and Dark       Extremes of heat or cold can disrupt sleep. A quiet environment is more sleep promoting than a noisy one. Noises can be masked with background white noise (such as the noise of a fan) or with earplugs. Bedrooms may be darkened with black-out shades or sleep masks can be worn. Position clocks out-of-sight since clock-watching can increase worry about the effects of lack of sleep. Be sure your mattress is not too soft or too firm and that your pillow is the right height and firmness. Eating       A light bedtime snack, such a glass of warm milk, cheese, or a bowl of cereal can promote sleep. You should avoid the following foods at bedtime:  any caffeinated foods (e.g., chocolate), peanuts, beans, most raw fruits and vegetables (since they may cause gas), and high-fat foods such as potato chips or corn chips. Avoid snacks in the middle of the nights since awakening may become associated with hunger. If you have trouble with regurgitation, be especially careful to avid heavy meals and spices in the evening. Do not go to bed too hungry or too full. It may help to elevate you head with some pillows.     Avoid Naps       Avoid naps, the sleep you obtain during the day takes away from you sleep need that night resulting in lighter, more restless sleep, difficulty falling asleep or early morning awakening. If you must nap, keep it brief, and take the nap about 8 hours after arising. It is best to set an alarm to ensure you dont sleep more than 10-15 minutes. Limit Your Time in Bed        Restrict your sleep period to the average number of hours you have actually slept per night during the preceding week. Quality of sleep is important. Too much time in bed can decrease the quality on subsequent night and contribute to the maintenance of existing sleep problems. Dont lay in bed for extended times not sleep. If you arent asleep in about 15-20 minutes go ahead and get up. Do something outside the bedroom that is relaxing. When you feel sleepy (i.e., yawning, head bobbing, eyes closing, concentration decreasing, then return to bed. Dont confuse tiredness with sleepiness, they are different. Tiredness doesnt lead to sleep, only sleepiness does. Regular Sleep Schedule       Keep a regular time each day, 7 days a week, to get out of bed. Keeping a regular awaking time helps set your circadian rhythm set so that your body learns to sleep at the desired time. Use the attached form to develop a plan for improving you sleep hygiene. It will take time for you sleep to get back in line so once you begin your sleep hygiene plan, stick with if for at least 6-8 weeks. Planned Improvements of My Sleep Hygiene    Check Those  That Apply  _____ Avoid Caffeine 6-8 Hours Before Bedtime. I will not have caffeine after ________ PM.    ____ Avoid Nicotine Before Bedtime. I will not have a cigarette after _________ PM.    ______  Limit Alcohol Use. I will not have more than _______ drinks in the evening.    ______ Avoid Use of Sleeping Pills. (If you are currently using them regularly, all changes should be   medical supervised by your medical provider).     ______ Reinaldo Muhammad just let the natural pressure of your body slowly move it out. It is normal for this healthy method of breathing to feel a little awkward at first.  With practice, it will feel more natural.    Get your mind on your side    One other important factor in getting relaxed is your mind. Your mind and body are connected. The mind influences the body and the body influences the mind. What you do with your mind when you are trying to relax is very important. The key is to avoid thinking about stressful things. You can think about      Neutral things (e.g., counting, saying a word like calm or relax)   Pleasant things (e.g., imagining a pleasant place)    It is recommended that you practice 2 times per day, 10 minutes each time. Deep Breathing Exercises      Exercise 1:  The Stimulating Breath (also called the Shweta Breath)   Its aim is to raise energy level and increase alertness. Inhale and exhale rapidly through your nose, keeping your mouth closed but relaxed. Your breaths in and out should be equal in duration, but as short as possible. This is a noisy breathing exercise. Try for three in-and-out breath cycles per second. This produces a quick movement of the diaphragm, suggesting a shweta. Breathe normally after each cycle. Do not do for more than 15 seconds on your first try. Each time you practice the Stimulating Breath, you can increase your time by five seconds or so, until you reach a full minute. If done properly, you may feel invigorated, comparable to the heightened awareness you feel after a good workout. You should feel the effort at the back of the neck, the diaphragm, the chest and the abdomen. Try this breathing exercise the next time you need an energy boost and feel yourself reaching for a cup of coffee. Exercise 2:  The 4-7-8 (or Relaxing Breath) Exercise  This exercise is utterly simple, takes almost no time, requires no equipment and can be done anywhere.  Although you can do the exercise in any position, sit with your back straight while learning the exercise. Place the tip of your tongue against the ridge of tissue just behind your upper front teeth, and keep it there through the entire exercise. You will be exhaling through your mouth around your tongue; try pursing your lips slightly if this seems awkward. Exhale completely through your mouth, making a whoosh sound. Close your mouth and inhale quietly through your nose to a mental count of four. Hold your breath for a count of seven. Exhale completely through your mouth, making a whoosh sound to a count of eight. This is one breath. Now inhale again and repeat the cycle three more times for a total of four breaths. Note that you always inhale quietly through your nose and exhale audibly through your mouth. The tip of your tongue stays in position the whole time. Exhalation takes twice as long as inhalation. The absolute time you spend on each phase is not important; the ratio of 4:7:8 is important. If you have trouble holding your breath, speed the exercise up but keep to the ratio of 4:7:8 for the three phases. With practice you can slow it all down and get used to inhaling and exhaling more and more deeply. This exercise is a natural tranquilizer for the nervous system. Unlike tranquilizing drugs, which are often effective when you first take them but then lose their power over time, this exercise is subtle when you first try it but gains in power with repetition and practice. Do it at least twice a day. You cannot do it too frequently. Do NOT do more than 4 breaths at one time for the first month of practice. Later, if you wish, you can extend it to eight breaths. If you feel a little lightheaded when you first breathe this way, do not be concerned; it will pass. Once you develop this technique by practicing it every day, it will be a very useful tool that you will always have with you.  Use it whenever anything upsetting happens - before you react. Use it whenever you are aware of internal tension. Use it to help you fall asleep. This exercise cannot be recommended too highly. Everyone can benefit from it. Exercise 3: Meditation exercise  Breath Counting  If you want to get a feel for this challenging work, try your hand at breath counting, a deceptively simple technique. Sit in a comfortable position with the spine straight and head inclined slightly forward. Gently close your eyes and take a few deep breaths. Then let the breath come naturally without trying to influence it. Ideally it will be quiet and slow, but depth and rhythm may vary. To begin the exercise, count \"one\" to yourself as you exhale. The next time you exhale, count \"two,\" and so on up to [place]. \"  Then begin a new cycle, counting \"one\" on the next exhalation. Never count higher than \"five,\" and count only when you exhale. You will know your attention has wandered when you find yourself up to \"eight,\" \"12,\" even \"19. \"  Try to do 10 minutes of this form of meditation. DETACHING FROM EMOTIONAL PAIN (GROUNDING)   Amador Vanegas, PhD     [facility]? Grounding is a set of simple strategies to detach from emotional pain (for example, drug cravings, self-harm impulses, anger, sadness). Distraction works by focusing outward on the external world--rather than inward toward the self. You can also think of it as distraction, centering, a safe place, looking outward, or healthy detachment.      WHY DO GROUNDING? When you are overwhelmed with emotional pain, you need a way to detach so that you can gain control over your feelings and stay safe. As long as you are grounding, you cannot possibly use substances or hurt yourself. Grounding anchors you to the present and to reality.     Many people with PTSD and substance abuse struggle with either feeling too much (overwhelming emotions and memories) or too little (numbing and dissociation). In grounding, you attain balance between the two -- conscious of reality and able to tolerate it. GUIDELINES     Grounding can be done any time, any place, anywhere and no one has to know. Use grounding when you are: faced with a trigger, having a flashback, dissociating, having a substance craving, or when your emotional pain goes above 6 (on a 0-10 scale). Grounding puts healthy distance between you and these negative feelings. Keep your eyes open, scan the room, and turn the light on to stay in touch with the present. Rate your mood before and after to test whether it worked. Before grounding, rate your level of emotional pain (0-10, where 10 means extreme pain). Then re-rate it afterwards. Has it gone down? No talking about negative feelings or journal writing. You want to distract away from negative feelings, not get in touch with them. Stay neutral--no judgments of good and bad. For example, The walls are blue; I dislike blue because it reminds me of depression.  Simply say The wafts are blue and move on. Focus on the present, not the past or future. Note that grounding is not the same as relaxation training. Grounding is much more active, focuses on distraction strategies, and is intended to help extreme negative feelings. It to believed to be more effective for PTSD than relaxation training. WAYS TO GROUND     Mental Grounding     ? Describe your environment in detail using all your senses. For example, The walls are white; there are five pink chairs, there is a wooden bookshelf against the vic. .. Describe objects, sounds, textures, colors, smells, shapes, numbers and temperature. You can do this anywhere. For example, on the subway: Im on the subway. Ill see the river soon. Those are the windows. This is the bench. The metal bar is silver. The subway map has four colors. ..   ? Play a Why Not Give Back game with yourself.  Try to think of types of dogs, Yasmin Valdez musicians, states that begin with A, cars, TV shows, writers, sports, [employer], [employer].    ? Do an age progression. If you have regressed to a younger age (e.g., 6years old), you can slowly work your way backup (e.g., Im now 9; Im now 10; Im now 11. ..) until you are back to your current age. ? Describe an everyday activity in great detail. For example, describe a meal that you cook (e.g., First I peel the potatoes and cut them into quarters, then I boil the water, I make an herb marinade of oregano, basil, garlic, and olive oil. Morris Hansen Morris Hansen ). ? Imagine. Use an image: Glide along on skates away from your pain; change the TV channel to a better show think of a wall as a buffer between you and your pain. ? Say a safety statement. [facility] name is _________; I am safe right now. I am in the present, not the past. I am located in _____________ the date is _____________. ? Read something, saying each word to yourself. Or read each letter backwards so that you focus or the letters and not on the meaning of words. ? Use humor. Think of something funny to jolt yourself out of your mood. ? Count to 10 or say the alphabet very s.. l..o..w..l. Morris Hansen y. ? Repeat a favorite saying to yourself over and over (e.g., the Serenity Prayer). Physical Grounding     ? Run cool or warm water over your hands. ?  Grab tightly onto your chair as hard as you can.   ? Touch various objects around you: a pen. keys, your clothing, the table, the walls. Notice textures, colors, materials, weight, temperature. Compare objects you touch: Is one colder? Lighter? ?  Dip your heels into the floor-- literally grounding them! Notice the tension centered in your heels as you do this. Remind yourself that you are connected to the ground. ?  Carry a ground object in your pocket--a small object (a small rock, holly, ring, piece of cloth or yarn) that you can touch whenever you feel triggered. ?  Jump up and down. ?  Notice your body: The weight of your body in the chair; wiggling your toes in your socks; the feel of your back against the chair. You are connected to the world. ?  Stretch. Extend your fingers, arms or legs as far as you can; roll your head around. ?  Walk slowly, noticing each footstep, saying left, right with each step. ?  Eat something. Describe the flavors in detail to yourself. ?  Focus on your breathing. Noticing each inhale and exhale. Repeat a pleasant word to yourself on each inhale (for example, a favorite, color or a soothing word such as safe or easy). Soothing Grounding     ? Say kind statements, as if you were talking to a small child. E.g.. You are a good person going through a hard time. Renae Saba get through this. ? Think of favorites. Think of your favorite color, animal, season, food, time of day, TV show. ? Picture people you care about (e.g., your children; and look at photographs of them). ? Remember the words to an inspiring song, quotation or poem that makes you feel better (e.g.. the Serenity Prayer). ? Remember a safe place. Describe a place that you find very soothing (perhaps the beach or mountains, or a favorite room); focus on everything about that place--the sounds, colors, shapes, objects, textures. ? Say a coping statement. I can handle this, This feeling will pass.    ? Plan out a safe treat for yourself, such as a piece of candy, a nice dinner, or a warm bath. ? Think of things you are looking forward to in the next week. Perhaps time with a friend or going to a movie. WHAT IF GROUNDING DOESNT WORK?     ? Practice as often as possible. Even when you dont need it, so that youll know it by heart. ? Practice faster. Speeding up the pace gets you focused on the outside world quickly. ? Try grounding for a Iooooooonnnng time (20-30 minutes). And, repeat, repeat, repeat.    ? Try to notice whether you do better with physical or mental grounding. ? Create your own methods of grounding. Any method you make up may be worth much more than those you read here because it is yours. ? Start grounding early in a negative mood cycle. 60+ Essential Positive Coping Skills    There are nearly infinite ways to cope. We all use different methods that suit our unique personalities and needs. You may find that what causes stress in one individual will help another to cope. Its not important whether you cope like everyone else; all that matters is that you find effective coping methods that will help you build resilience and thrive! That being said, dont beat yourself up if you just need a little distraction sometimes. Below, youll find Lianna Ambrosio (2016) master list of coping methods and skills organized into categories. Whatever you need in the moment, there is probably at least one activity mentioned below that will help!     Diversions:  Write, draw, paint, photography   Play an instrument, sing, dance, act   Take a shower or a bath   Garden   Take a walk, or go for a drive   Watch television or a movie   Watch cute kitten videos on [employer] a game   Go shopping   Clean or organize your environment   Read   Take a break or vacation    Social/Interpersonal Coping:  Talk to someone you trust   Set boundaries and say no   Write a note to someone you care about   Be assertive   Use humor  Spend time with friends and/or family   Serve someone in need   Care for or play with a pet   Role-play challenging situations with others   Encourage others    Cognitive Coping:  Make a gratitude list  Brainstorm solutions   Lower your expectations of the situation   Keep an inspirational quote with you   Be flexible   Write a list of goals   Take a class Act opposite of negative feelings   Write a list of pros and cons for decisions   Reward or pamper yourself when successful   Write a list of strengths   Accept a challenge with a

## 2022-09-06 NOTE — PROGRESS NOTES
Behavioral Health Consultation/Psychotherapy Note  Amita Bustamante Psy.D. Visit Date:  9/6/2022    Patient:  Ernesto Lozoya  YOB: 1995  Chief Complaint:  Anxiety    Duration of session:  60 minutes      S:       Patient presented alone for appointment and engaged readily. Patient reviewed referral information provided by referring provider and confirmed contents. Patient provided additional information to clarify and/or elaborate upon provided referral info. Patient indicated no significant changes in context since last contact. Patient reported continued difficulties with variable mood, trauma response, intermittent panic attacks, and situational stress involving familial / relational factors. Patient reviewed psychoeducational content associated with topics of session as appropriate, including information on developmental conditions and review of basic principles of self-care and illness management/recovery. Patient engaged in thought challenging and cognitive restructuring tasks as needed. Patient reviewed recent use of self-care and active coping strategies and discussed areas for potential adjustments which might better suit patient's behavioral needs. Patient discussed current treatment needs and reviewed available options.      Topic areas discussed during session:  Specific discussion of new / existing symptoms  Evaluation for differential diagnosis  Family stress  Behavioral skill-building and/or practice  Identification of resources      O:    Appearance    Patient presents as alert, oriented, and cooperative  Appetite variable  Sleep disturbance Yes  Loss of pleasure Yes  Speech    rapid and high productivity  Mood    Anxious  Affect    anxiety  Thought Process    circumstantial but coherent  Insight    Good  Judgment    generally intact  Memory    recent and remote memory intact  Suicide Assessment    intermittent escape ideation, no passive / active ideation / plan / intent      A:    1. Panic disorder    2. PTSD (post-traumatic stress disorder)    3. Bipolar disease, chronic ([address]        Patient presents for consultation regarding symptoms of mood disruption and anxiety associated with multiple prior diagnoses and recent elevations of situational stress. Patient reports that she has previously worked with mental health professionals, but has not been engaged in treatment for some time. Patient also reports a previous diagnosis of borderline personality disorder. Patient reports elevated stress levels within home / family / medical environment(s) with moderate-to-severe associated increase of symptom severity. Patient diagnosis has been updated to account for information gathered during today's contact. Patient has been encouraged to continue regular use of self-care and active coping strategies as reviewed. Patient has been recommended to continue using previously provided information on CBT and relaxation strategies, to maintain medication compliance, to follow up with medical providers as directed, and to follow up with behavioral health as needed. Patient is in agreement with this plan and has been scheduled for follow-up. Patient is aware that they are able to reach out as needed for additional information or support prior to the next scheduled contact.       P:    Provided education on the use of medication to treat  anxiety, stress, and mood swings, Discussed various factors related to the development and maintenance of  anxiety, stress, and mood swings (including biological, cognitive, behavioral, and environmental factors), Discussed potential treatments for  anxiety, stress, and mood swings, Discussed self-care (sleep, nutrition, rewarding activities, social support, exercise), Discussed benefits of referral for specialty care, Discussed and problem-solved barriers in adhering to behavioral change plan, Motivational Interviewing to increase patient confidence and compliance with adhering to behavioral change plan, Motivational Interviewing to determine importance and readiness for change, Discussed potential barriers to change, Established rapport, Conducted functional assessment, [place]-setting to identify pt's primary goals for [facility] visit / overall health, Supportive techniques, Provided Psychoeducation re: developmental conditions, CBT to target cognitive distortions, and Identified maladaptive thoughts    Patient Plan:  1) Review the attached information on sleep hygiene. Go through the worksheet at the end of it; are there things that make sense to try right now? If so, do them. 2) Make sure to eat regularly. If you haven't eaten in 3-4 hours, grab at least a small snack, even if you have no appetite. If your stomach is talking to you, listen. 3) Aim for at least 30-40 cumulative minutes of movement each day. This doesn't have to be all at once, or a workout. Just stretch or dance to a song you like. If you've been sitting for more than an hour or two, get up and move around for a few minutes. 4) Review the attached list of coping skills. Try 1-2 a week; do more if you're feeling frisky! 5) Check out the attached info on grounding techniques. These can be used to disrupt the hamster when it gets overly lively. The relief will only be temporary, so make sure that you follow up with something relaxing and/or distracting to prolong the relief you may get. 6) Review the attached information on deep breathing and relaxation. Use this to help manage intense emotions, or just to relax. Practice this daily, even if you're not feeling particularly stressed. 7) Are there things that you've stopped doing because of stress or your mood? If so, make note of them and consider some ways to reincorporate them into your life. This is also something that can be discussed in counseling. 8) Remember to be kind to yourself.  This is a challenging experience, and you're doing the best you can. Patient to return in 3-4 week(s) for follow-up. All questions about treatment plan answered. Patient instructed to call the crisis line and/or proceed to emergency room if suicidal or homicidal ideations occur outside of clinic hours and crisis management skills do not provide relief. Patient stated understanding and is agreeable to treatment and crisis plan.     (Please note that portions of this note were completed with a voice recognition program. Efforts were made to edit the dictations but occasionally words are mis-transcribed.)    Provider Signature:  Electronically signed by Silvio Perrin PSYD on 9/6/2022 at 2:31 PM

## 2022-09-06 NOTE — PSYCHOTHERAPY
Has 1 y/o autistic son. 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 Still doing bloodwork and seizure testing. Working with board of DD. Working on learning ASL together, as is nonverbal / minimally verbal currently. Struggles when has multiple things needs to keep track of in one day. Has been clean off meth for 3 years. Back in mom's house currently. Some antsiness b/c was still using at the time left mom's house initially. Works at [employer] in [employer]; Thursdays - Sundays only. Recently left son's dad to better self. Previously  for 7 years. Lost four kids to [facility] in [place]. Current ex seems \"textbook narcissist,\" snaps easily, restricts access to son when he is babysitting and upset with her.

## 2022-09-24 DIAGNOSIS — F41.0 PANIC DISORDER: ICD-10-CM

## 2022-09-24 DIAGNOSIS — F43.10 PTSD (POST-TRAUMATIC STRESS DISORDER): ICD-10-CM

## 2022-09-26 RX ORDER — ALPRAZOLAM 1 MG/1
TABLET ORAL
Qty: 90 TABLET | Refills: 0 | Status: SHIPPED | OUTPATIENT
Start: 2022-09-26 | End: 2022-10-27

## 2022-09-29 ENCOUNTER — HOSPITAL ENCOUNTER (OUTPATIENT)
Age: 27
Setting detail: SPECIMEN
Discharge: HOME | End: 2022-09-29
Payer: MEDICARE

## 2022-09-29 ENCOUNTER — OFFICE VISIT (OUTPATIENT)
Dept: PRIMARY CARE CLINIC | Age: 27
End: 2022-09-29
Payer: MEDICARE

## 2022-09-29 VITALS
DIASTOLIC BLOOD PRESSURE: 80 MMHG | BODY MASS INDEX: 30.62 KG/M2 | WEIGHT: 202 LBS | HEART RATE: 88 BPM | SYSTOLIC BLOOD PRESSURE: 110 MMHG | TEMPERATURE: 98.6 F | HEIGHT: 68 IN | OXYGEN SATURATION: 96 %

## 2022-09-29 DIAGNOSIS — N91.2 AMENORRHEA: ICD-10-CM

## 2022-09-29 DIAGNOSIS — J40 BRONCHITIS: Primary | ICD-10-CM

## 2022-09-29 DIAGNOSIS — R06.2 WHEEZES: ICD-10-CM

## 2022-09-29 LAB — HCG(URINE) PREGNANCY TEST: NEGATIVE

## 2022-09-29 PROCEDURE — 99212 OFFICE O/P EST SF 10 MIN: CPT | Performed by: NURSE PRACTITIONER

## 2022-09-29 PROCEDURE — 81025 URINE PREGNANCY TEST: CPT

## 2022-09-29 PROCEDURE — 4004F PT TOBACCO SCREEN RCVD TLK: CPT | Performed by: NURSE PRACTITIONER

## 2022-09-29 PROCEDURE — G8417 CALC BMI ABV UP PARAM F/U: HCPCS | Performed by: NURSE PRACTITIONER

## 2022-09-29 PROCEDURE — G8427 DOCREV CUR MEDS BY ELIG CLIN: HCPCS | Performed by: NURSE PRACTITIONER

## 2022-09-29 PROCEDURE — 99213 OFFICE O/P EST LOW 20 MIN: CPT | Performed by: NURSE PRACTITIONER

## 2022-09-29 RX ORDER — AZITHROMYCIN 250 MG/1
TABLET, FILM COATED ORAL
Qty: 1 PACKET | Refills: 0 | Status: SHIPPED | OUTPATIENT
Start: 2022-09-29 | End: 2022-10-31 | Stop reason: ALTCHOICE

## 2022-09-29 RX ORDER — PREDNISONE 20 MG/1
20 TABLET ORAL 2 TIMES DAILY
Qty: 10 TABLET | Refills: 0 | Status: SHIPPED | OUTPATIENT
Start: 2022-09-29 | End: 2022-10-04

## 2022-09-29 ASSESSMENT — ENCOUNTER SYMPTOMS
CHEST TIGHTNESS: 0
RHINORRHEA: 1
WHEEZING: 1
COUGH: 1
SHORTNESS OF BREATH: 0
SORE THROAT: 0

## 2022-09-29 NOTE — PROGRESS NOTES
[facility]             [address], [address]                        Telephone [phone]             Fax [phone]     Charles Da Silva  1995  [facility]:[id number]   Date of visit:  9/29/2022    Subjective:    Charles Da Silva is a 32 y.o.  female who presents to [facility] today (9/29/2022) for evaluation of:    Chief Complaint   Patient presents with    Cough     Pt is using inhaler past the amount of times she is supposed to use it. Sx began week ago as a cold and now hard to breath and cannot cough anything up. Cough  This is a new problem. The current episode started in the past 7 days (X 1 week). The problem has been gradually worsening. The problem occurs every few minutes. The cough is Non-productive. Associated symptoms include a fever (low grade last night), headaches (intermittent, frontal, mild), nasal congestion, postnasal drip, rhinorrhea and wheezing. Pertinent negatives include no chest pain, chills, myalgias, rash, sore throat or shortness of breath. Nothing aggravates the symptoms. Treatments tried: albuterol inhaler, sudafed, nyquil. The treatment provided no relief. tobacco use   Negative home Covid-19 test 4 days ago.       She has the following problem list:  Patient Active Problem List   Diagnosis    Panic disorder    PTSD (post-traumatic stress disorder)    Bipolar disease, chronic (HCC)    Borderline personality disorder ([facility] [address]    Depression    Tobacco abuse    Class 2 obesity without serious comorbidity with body mass index (BMI) of 35.0 to 35.9 in adult    Cigarette smoker        Current medications are:  Current Outpatient Medications   Medication Sig Dispense Refill    ALPRAZolam (XANAX) 1 MG tablet take 1 tablet by mouth three times a day if needed 90 tablet 0    VENTOLIN  (90 Base) MCG/ACT inhaler inhale 1-2 puffs by mouth and INTO THE LUNGS every 6 hours if needed 18 g 11    busPIRone (BUSPAR) 15 MG tablet take 1 tablet by mouth three times a day 90 tablet 5    naproxen (NAPROSYN) 500 MG tablet Take 1 tablet by mouth 2 times daily (with meals) 30 tablet 11    tiZANidine (ZANAFLEX) 4 MG tablet take 1 tablet by mouth every 8 hours if needed for muscle spasm 90 tablet 5     No current facility-administered medications for this visit. She is allergic to codeine and tape [adhesive tape]. .    She  reports that she has been smoking cigarettes. She has a 1.50 pack-year smoking history. She has never used smokeless tobacco.      Objective:    Vitals:    09/29/22 0945   BP: 110/80   Pulse: 88   Temp: 98.6 °F (37 °C)   TempSrc: Tympanic   SpO2: 96%   Weight: 202 lb (91.6 kg)   Height: 5' 8\" (1.727 m)     Body mass index is 30.71 kg/m². Review of Systems   Constitutional:  Positive for fever (low grade last night). Negative for chills. HENT:  Positive for congestion, postnasal drip and rhinorrhea. Negative for sore throat. Respiratory:  Positive for cough and wheezing. Negative for chest tightness and shortness of breath. Cardiovascular: Negative. Negative for chest pain. Genitourinary:         LMP 08/19/22   Musculoskeletal:  Negative for myalgias. Skin:  Negative for rash. Neurological:  Positive for headaches (intermittent, frontal, mild). Physical Exam  Vitals and nursing note reviewed. Constitutional:       Appearance: Normal appearance. She is well-developed. HENT:      Head: Normocephalic. Jaw: There is normal jaw occlusion. Right Ear: Tympanic membrane, ear canal and external ear normal.      Left Ear: Tympanic membrane, ear canal and external ear normal.      Nose: Congestion present. Right Turbinates: Swollen. Left Turbinates: Swollen. Right Sinus: No maxillary sinus tenderness or frontal sinus tenderness. Left Sinus: No maxillary sinus tenderness or frontal sinus tenderness.       Mouth/Throat:      Lips: Pink.      Mouth: Mucous membranes are moist.      Pharynx: Oropharynx is clear. Uvula midline. Tonsils: 1+ on the right. 1+ on the left. Eyes:      Pupils: Pupils are equal, round, and reactive to light. Cardiovascular:      Rate and Rhythm: Normal rate and regular rhythm. Heart sounds: Normal heart sounds. Pulmonary:      Effort: Pulmonary effort is normal.      Breath sounds: Normal air entry. Wheezing (inspiration and expiration bilateral throughout) present. Musculoskeletal:      Cervical back: Normal range of motion and neck supple. Lymphadenopathy:      Cervical: No cervical adenopathy. Skin:     General: Skin is warm and dry. Neurological:      General: No focal deficit present. Mental Status: She is alert and oriented to person, place, and time. Psychiatric:         Behavior: Behavior normal.         Thought Content: Thought content normal.       Assessment and Plan:    Hospital Outpatient Visit on 09/29/2022   Component Date Value Ref Range Status    HCG(Urine) Pregnancy Test 09/29/2022 NEGATIVE  NEGATIVE Final    Comment: Specimens with hCG levels near the threshold of the test (25 mIU/mL) may give a negative or   indeterminate result. In such cases, another test should be performed with a new specimen   in 48-72 hours. If early pregnancy is suspected clinically in this setting, correlation   with quantitative serum b-hCG level is suggested. Diagnosis Orders   1. Bronchitis        2. Wheezes        3. Amenorrhea  Pregnancy, Urine          Complete full course of antibiotic. Take prednisone as directed. Stop taking naproxen while taking prednisone. I recommended that she use mucinex to help with congestion and cough. I also recommended Flonase for sinus symptoms. she was also encouraged to use tylenol for pain/fever. Increase water intake. Use cool mist humidifier at bedtime. Use nasal saline flush as needed. Good hand hygiene.  she was instructed to return if there is no improvement or symptoms worsen. The use, risks, benefits, and side effects of prescribed or recommended medications were discussed. All questions were answered and the patient/caregiver voiced understanding. No orders of the defined types were placed in this encounter.         Electronically signed by JESSICA Marshall CNP on 9/29/22 at 10:01 AM EDT

## 2022-09-30 ENCOUNTER — TELEPHONE (OUTPATIENT)
Dept: INTERNAL MEDICINE | Age: 27
End: 2022-09-30

## 2022-09-30 ENCOUNTER — HOSPITAL ENCOUNTER (OUTPATIENT)
Dept: GENERAL RADIOLOGY | Age: 27
Discharge: HOME | End: 2022-10-02
Payer: MEDICARE

## 2022-09-30 DIAGNOSIS — J40 BRONCHITIS: ICD-10-CM

## 2022-09-30 DIAGNOSIS — J40 BRONCHITIS: Primary | ICD-10-CM

## 2022-09-30 PROCEDURE — 71046 X-RAY EXAM CHEST 2 VIEWS: CPT

## 2022-09-30 RX ORDER — ALBUTEROL SULFATE 2.5 MG/3ML
2.5 SOLUTION RESPIRATORY (INHALATION) EVERY 6 HOURS PRN
Qty: 120 EACH | Refills: 3 | Status: SHIPPED | OUTPATIENT
Start: 2022-09-30

## 2022-09-30 RX ORDER — SOFT LENS DISINFECTANT
SOLUTION, NON-ORAL MISCELLANEOUS
Qty: 1 EACH | Refills: 0 | Status: SHIPPED | OUTPATIENT
Start: 2022-09-30

## 2022-09-30 NOTE — TELEPHONE ENCOUNTER
----- Message from Lisa Graves sent at 9/30/2022  4:01 PM EDT -----  Subject: Results Request    QUESTIONS  Results: Debora Fields; Ordered by:   Date Performed: 2022-09-30  ---------------------------------------------------------------------------  --------------  Yesy Deras [facility]    [phone]; OK to leave message on voicemail  ---------------------------------------------------------------------------  --------------

## 2022-09-30 NOTE — TELEPHONE ENCOUNTER
Patient is requesting a return call from gabrielle when possible. Patient is concerned about the way she reacted to some medications that were just started and needs to know if she should continue them. Please call patient when possible.

## 2022-09-30 NOTE — TELEPHONE ENCOUNTER
It's unlikely that the Xanax would interact with the prednisone and Zithromax in the way that she experienced. It's more likely that the infection is causing some of what is happening. Prednisone can make Xanax less potent, not more, if they are taken together. It is ok for her to continue both medicines, but she should wait a couple of hours between those medicines and her Xanax. Will order chest x-ray and nebulizer.

## 2022-09-30 NOTE — TELEPHONE ENCOUNTER
Renee Bolanos went to the Urgent Care, started her on Prednisone and Azithromycin for bronchitis. Took those medications yest and because of her anxiety she also took her Xanax, only took 1/2 the dose of Xanax and was really out of it, felt like her head was floating, could hear everyone but felt she was \"not there\". She wonders if taking all three meds the same time is causing this? Is afraid that she shouldn't have taken the Xanax with these medications. Doesn't want to take anymore of the prednisone or Azithromycin without your okay. Would like a chest xray and a nebulizer and meds for constant cough and wheezing. Uses [facility] [address].     Patient is aware we may need to send the script for the nebulizer machine and tubing to [facility] and she was okay with that.

## 2022-10-26 DIAGNOSIS — F41.0 PANIC DISORDER: ICD-10-CM

## 2022-10-26 DIAGNOSIS — F43.10 PTSD (POST-TRAUMATIC STRESS DISORDER): ICD-10-CM

## 2022-10-27 RX ORDER — TIZANIDINE 4 MG/1
TABLET ORAL
Qty: 90 TABLET | Refills: 5 | Status: SHIPPED | OUTPATIENT
Start: 2022-10-27

## 2022-10-27 RX ORDER — ALPRAZOLAM 1 MG/1
TABLET ORAL
Qty: 90 TABLET | Refills: 0 | Status: SHIPPED | OUTPATIENT
Start: 2022-10-27 | End: 2022-11-23

## 2022-10-27 NOTE — TELEPHONE ENCOUNTER
Pharmacy  requesting a refill of the below medication which has been pended for you:     Requested Prescriptions     Pending Prescriptions Disp Refills    ALPRAZolam (XANAX) 1 MG tablet [Pharmacy Med Name: ALPRAZOLAM 1 MG TABLET] 90 tablet 0     Sig: take 1 tablet by mouth three times a day if needed    tiZANidine (Douglas Smolder) 4 MG tablet [Pharmacy Med Name: TIZANIDINE HCL 4 MG TABLET] 90 tablet 5     Sig: take 1 tablet by mouth every 8 hours if needed for muscle spasm       Last Appointment Date: 7/29/2022  Next Appointment Date: 10/31/2022    Allergies   Allergen Reactions    Codeine Anaphylaxis     Happened when in ER getting arm reset at age 9  States did have to be tubed    Tape [Adhesive Tape] Other (See Comments)     Paper tape causes blisters

## 2022-10-27 NOTE — TELEPHONE ENCOUNTER
[place]    Controlled Substance Monitoring:    Acute and Chronic Pain Monitoring:   RX Monitoring 10/27/2022   Attestation -   Periodic Controlled Substance Monitoring No signs of potential drug abuse or diversion identified.

## 2022-10-31 ENCOUNTER — TELEMEDICINE (OUTPATIENT)
Dept: INTERNAL MEDICINE | Age: 27
End: 2022-10-31
Payer: MEDICARE

## 2022-10-31 DIAGNOSIS — F31.9 BIPOLAR DISEASE, CHRONIC (HCC): ICD-10-CM

## 2022-10-31 DIAGNOSIS — Z72.0 TOBACCO USE: ICD-10-CM

## 2022-10-31 DIAGNOSIS — F41.0 PANIC DISORDER: Primary | ICD-10-CM

## 2022-10-31 DIAGNOSIS — F43.10 PTSD (POST-TRAUMATIC STRESS DISORDER): ICD-10-CM

## 2022-10-31 DIAGNOSIS — F17.210 CIGARETTE NICOTINE DEPENDENCE WITHOUT COMPLICATION: ICD-10-CM

## 2022-10-31 PROCEDURE — G8427 DOCREV CUR MEDS BY ELIG CLIN: HCPCS | Performed by: INTERNAL MEDICINE

## 2022-10-31 PROCEDURE — 99406 BEHAV CHNG SMOKING 3-10 MIN: CPT | Performed by: INTERNAL MEDICINE

## 2022-10-31 PROCEDURE — 99214 OFFICE O/P EST MOD 30 MIN: CPT | Performed by: INTERNAL MEDICINE

## 2022-10-31 PROCEDURE — 99211 OFF/OP EST MAY X REQ PHY/QHP: CPT | Performed by: INTERNAL MEDICINE

## 2022-10-31 RX ORDER — FERROUS SULFATE 325(65) MG
325 TABLET ORAL
COMMUNITY

## 2022-10-31 SDOH — ECONOMIC STABILITY: FOOD INSECURITY: WITHIN THE PAST 12 MONTHS, THE FOOD YOU BOUGHT JUST DIDN'T LAST AND YOU DIDN'T HAVE MONEY TO GET MORE.: NEVER TRUE

## 2022-10-31 SDOH — ECONOMIC STABILITY: FOOD INSECURITY: WITHIN THE PAST 12 MONTHS, YOU WORRIED THAT YOUR FOOD WOULD RUN OUT BEFORE YOU GOT MONEY TO BUY MORE.: NEVER TRUE

## 2022-10-31 ASSESSMENT — SOCIAL DETERMINANTS OF HEALTH (SDOH): HOW HARD IS IT FOR YOU TO PAY FOR THE VERY BASICS LIKE FOOD, HOUSING, MEDICAL CARE, AND HEATING?: NOT HARD AT ALL

## 2022-10-31 NOTE — PROGRESS NOTES
DR. Konstantin Padgett - TELEHEALTH PROGRESS NOTE    CHIEF COMPLAINT/HISTORY OF CHIEF COMPLAINT: This 32 y.o.  female presents via TeleHealth visit today for ongoing evaluation and management of her panic disorder, post-traumatic stress disorder, Bipolar disorder, depression, and obesity. She takes Buspar and uses Xanax for her anxiety and post-traumatic stress disorder. She has had one appointment with Dr. Asiya Humphries so far, and is working on getting follow up appointments done, but it has been difficult to get the schedules to align and her son's illnesses are making it hard as well. She feels that her psychiatric illnesses are improving since she  from her boyfriend and moved back in with her mother. She is in the process of getting another job as well. She is gaining weight again, too. She uses naproxen and Zanaflex for chronic back pain. She is still smoking, which has gone up to a little more than 1/2 pack of cigarettes a day. ALLERGIES/INTOLERANCES:   Allergies   Allergen Reactions    Codeine Anaphylaxis     Happened when in ER getting arm reset at age 9  States did have to be tubed    Tape [Adhesive Tape] Other (See Comments)     Paper tape causes blisters       MEDICATIONS:   Outpatient Medications Marked as Taking for the 10/31/22 encounter (Telemedicine) with Marjorie Macario, DO   Medication Sig Dispense Refill    ferrous sulfate (IRON 325) 325 (65 Fe) MG tablet Take 325 mg by mouth daily (with breakfast) Patient reports she just started this herself and only taking one tablet every other day.       ALPRAZolam (XANAX) 1 MG tablet take 1 tablet by mouth three times a day if needed 90 tablet 0    tiZANidine (ZANAFLEX) 4 MG tablet take 1 tablet by mouth every 8 hours if needed for muscle spasm 90 tablet 5    albuterol (PROVENTIL) (2.5 MG/3ML) 0.083% nebulizer solution Take 3 mLs by nebulization every 6 hours as needed for Wheezing or Shortness of Breath 120 each 3    Respiratory Therapy Supplies (NEBULIZER) YASH Use as directed for bronchitis. 1 each 0    VENTOLIN  (90 Base) MCG/ACT inhaler inhale 1-2 puffs by mouth and INTO THE LUNGS every 6 hours if needed 18 g 11    busPIRone (BUSPAR) 15 MG tablet take 1 tablet by mouth three times a day 90 tablet 5    naproxen (NAPROSYN) 500 MG tablet Take 1 tablet by mouth 2 times daily (with meals) 30 tablet 11       IMMUNIZATIONS: Reviewed for influenza and pneumococcal status as indicated in electronic record. REVIEW OF SYSTEMS:     General: negative for - chills, fever, or night sweats  Skin: negative for - pruritus or rash  Head: Negative for: headache or recent history of head trauma  Ear, Nose, Throat: negative for - epistaxis, nasal congestion, nasal discharge, sore throat, tinnitus, or vertigo  Cardiovascular: negative for - chest pain, dyspnea on exertion, or shortness of breath  Respiratory: negative for - cough, hemoptysis, or wheezing  Gastrointestinal: negative for - constipation, diarrhea, or nausea/vomiting  Genitourinary: negative for - dysuria, hematuria, or nocturia  Musculoskeletal: positive for - joint pain  negative for - muscle pain or muscular weakness  Neurologic: negative for - gait disturbance, numbness/tingling, seizures, or tremors  Psychiatric: positive for - anxiety and post-traumatic stress disorder   negative for - anxiety    PHYSICAL EXAMINATION:    (Due to this being a TeleHealth encounter, evaluation of the following organ systems is limited: Vitals/General/Skin/HEENT/Lungs/Heart/Abdomen/Genitourinary/Musculoskeletal/Neurologic.     Wt Readings from Last 2 Encounters:   09/29/22 202 lb (91.6 kg)   07/29/22 204 lb (92.5 kg)       Patient-Reported Vitals 10/31/2022   Patient-Reported Weight 201.6 lb   Patient-Reported Height 5' 9\"   Patient-Reported Systolic 933   Patient-Reported Diastolic 60   Patient-Reported Pulse 78   Patient-Reported Temperature 98.4      There is no height or weight on file to calculate BMI.    General: This is a 32 y.o.  female who is alert and oriented to person, place, and time. She appears to be her stated age and does not appear to be in any acute distress. She was able to follow commands. Affect appropriate for the situation. There were no hallucinations. Skin: Skin color, texture, turgor appears normal. No apparent rashes or lesions. HEENT/Neck: Head: Normal, normocephalic, atraumatic  Eye: Normal appearing external eye, conjunctiva, lids cornea. EOM appear intact bilaterally. Ears: Normal appearing external ears. Nose: Normal appearing external nose. No discharge. Pharynx: Mucous membranes appear moist.  Neck: No masses visualized. Pulmonary/Chest: Chest rises and falls symmetrically with inspiration and expiration. No accessory muscle use noted. Normal respiratory effort. No signs of difficulty breathing or respiratory distress visualized. Abdomen: Obese  Musculoskeletal: Normal appearing range of motion of neck. Extremities: Within the limitations of TeleHealth examination there does not appear to be any clubbing, cyanosis, or edema in any of the extremities. Neurologic: No gaze palsy. No facial asymmetry (Cranial Nerve 7 motor function). Exam limited due to video visit. Osteopathic Structural Examination: Unable to perform due to limitations of Telehealth. ASSESSMENT/PLAN:    1. Panic disorder, stable  2. PTSD (post-traumatic stress disorder), stable  3. Bipolar disease, chronic ([facility] stable  - She will continue to work with Dr. Ebony Cole  - We reviewed her most recent visit note   - She will continue to take her psychiatric medications    4. Tobacco use  5. Cigarette nicotine dependence without complication  - She was advised to quit smoking immediately and completely.  The risks of continued smoking were reviewed with her and she did verbalize understanding.  - Tobacco Cessation Counseling: Patient advised about behavior change, including information about personal health harms, usage of appropriate cessation measures and benefits of cessation. Time spent (minutes): 5 minutes       Orders Placed This Encounter   Procedures    PA TOBACCO USE CESSATION INTERMEDIATE 3-10 MINUTES       Requested Prescriptions      No prescriptions requested or ordered in this encounter       She seems to be doing fairly well with regards to her chronic health problems so we are not going to make any changes to her medications. Return in about 3 months (around 1/31/2023). Pursuant to the emergency declaration under the [address] [place] [address], [address] [place] [address] authority and the [employer] Act, this TeleHealth visit was conducted, with patient's (and/or their legal guardian's, if applicable) consent, to reduce the patient's risk of exposure to COVID-19 and provide continuity of care for an established patient. Services were provided through a synchronous (real-time) audio-video encounter (using [email or web address] me) to substitute for in-person clinic visit. The patient (and/or guardian) is aware that this is a billable service, which includes applicable co-pays. The originating site was the patient's home and the distant site was the provider's office. The patient was physically present in a state where the provider is licensed to provide care. Patient's identity was verified via name and date of birth.         Electronically signed by Ching Scanlon DO on 10/31/2022 at 12:18 PM  Internal Medicine

## 2022-10-31 NOTE — PROGRESS NOTES
Jennifer received counseling on the following healthy behaviors: nutrition and exercise  Reviewed prior labs and health maintenance  Continue current medications, diet and exercise. Discussed use, benefit, and side effects of prescribed medications. Barriers to medication compliance addressed. Patient given educational materials - see patient instructions  Was a self-tracking handout given in paper form or via [employer]? No: not indicated     Requested Prescriptions      No prescriptions requested or ordered in this encounter       All patient questions answered. Patient voiced understanding. Quality Measures    There is no height or weight on file to calculate BMI. Elevated. Weight control plan discussed: Healthy diet and regular exercise. Blood pressure is normal. Treatment plan: See main progress note. No results found for: LDLCALC, LDLCHOLESTEROL, LDLDIRECT (goal LDL reduction with dx if diabetes is 50% LDL reduction)      PHQ Scores 7/29/2022   PHQ2 Score 2   PHQ9 Score 3     See progress note for plan, if depression exists. Interpretation of Total Score: Major depression if the answer to questions 1 or 2 and 5 or more of questions 1 to 9 are at least [facility] than half the days. \"  Other depressive syndrome if questions 1 or 2 and two, three, or four of questions 1 to 9 are at least [facility] than half the days\"   Depression Severity: 5-9 = Mild depression, 10-14 = Moderate depression, 15-19 = Moderately severe depression, 20-27 = Severe depression

## 2022-11-23 DIAGNOSIS — F41.0 PANIC DISORDER: ICD-10-CM

## 2022-11-23 DIAGNOSIS — F43.10 PTSD (POST-TRAUMATIC STRESS DISORDER): ICD-10-CM

## 2022-11-23 RX ORDER — ALPRAZOLAM 1 MG/1
TABLET ORAL
Qty: 90 TABLET | Refills: 0 | Status: SHIPPED | OUTPATIENT
Start: 2022-11-23 | End: 2022-12-23

## 2022-11-23 NOTE — TELEPHONE ENCOUNTER
Pharmacy requesting a refill of the below medication which has been pended for you:     Requested Prescriptions     Pending Prescriptions Disp Refills    ALPRAZolam (XANAX) 1 MG tablet [Pharmacy Med Name: ALPRAZOLAM 1 MG TABLET] 90 tablet      Sig: take 1 tablet by mouth three times a day if needed       Last Appointment Date: 10/31/2022  Next Appointment Date: 1/31/2023    Allergies   Allergen Reactions    Codeine Anaphylaxis     Happened when in ER getting arm reset at age 9  States did have to be tubed    Tape [Adhesive Tape] Other (See Comments)     Paper tape causes blisters

## 2022-12-16 ENCOUNTER — OFFICE VISIT (OUTPATIENT)
Dept: PRIMARY CARE CLINIC | Age: 27
End: 2022-12-16
Payer: MEDICARE

## 2022-12-16 VITALS
TEMPERATURE: 99.3 F | WEIGHT: 195 LBS | HEIGHT: 69 IN | OXYGEN SATURATION: 98 % | BODY MASS INDEX: 28.88 KG/M2 | HEART RATE: 96 BPM | DIASTOLIC BLOOD PRESSURE: 90 MMHG | SYSTOLIC BLOOD PRESSURE: 130 MMHG

## 2022-12-16 DIAGNOSIS — J10.1 INFLUENZA A: Primary | ICD-10-CM

## 2022-12-16 DIAGNOSIS — R52 BODY ACHES: ICD-10-CM

## 2022-12-16 LAB
INFLUENZA A ANTIGEN, POC: POSITIVE
INFLUENZA B ANTIGEN, POC: NEGATIVE
LOT EXPIRE DATE: ABNORMAL
LOT KIT NUMBER: ABNORMAL
SARS-COV-2, POC: ABNORMAL
VALID INTERNAL CONTROL: ABNORMAL
VENDOR AND KIT NAME POC: ABNORMAL

## 2022-12-16 PROCEDURE — G8427 DOCREV CUR MEDS BY ELIG CLIN: HCPCS | Performed by: FAMILY MEDICINE

## 2022-12-16 PROCEDURE — G8417 CALC BMI ABV UP PARAM F/U: HCPCS | Performed by: FAMILY MEDICINE

## 2022-12-16 PROCEDURE — 4004F PT TOBACCO SCREEN RCVD TLK: CPT | Performed by: FAMILY MEDICINE

## 2022-12-16 PROCEDURE — 99204 OFFICE O/P NEW MOD 45 MIN: CPT | Performed by: FAMILY MEDICINE

## 2022-12-16 PROCEDURE — 99203 OFFICE O/P NEW LOW 30 MIN: CPT

## 2022-12-16 PROCEDURE — 87428 SARSCOV & INF VIR A&B AG IA: CPT | Performed by: FAMILY MEDICINE

## 2022-12-16 PROCEDURE — G8484 FLU IMMUNIZE NO ADMIN: HCPCS | Performed by: FAMILY MEDICINE

## 2022-12-16 RX ORDER — OSELTAMIVIR PHOSPHATE 75 MG/1
75 CAPSULE ORAL 2 TIMES DAILY
Qty: 10 CAPSULE | Refills: 0 | Status: SHIPPED | OUTPATIENT
Start: 2022-12-16

## 2022-12-16 ASSESSMENT — ENCOUNTER SYMPTOMS
SINUS PAIN: 1
SINUS PRESSURE: 1
COUGH: 1
SINUS COMPLAINT: 1
SORE THROAT: 1
RHINORRHEA: 1

## 2022-12-16 NOTE — LETTER
[facility] A department of [facility], [place], operated by [facility]  Phone: [phone]  Fax: [phone],       December 16, 2022    Patient:   Priscilla Price  Date of Birth   1995  Date of visit   12/16/2022        To Whom it May Concern:      Ghislaine Sin was seen in my clinic on 12/16/2022. Please excuse from work through 12/18/22 due to acute illness. If you have any questions or concerns, please don't hesitate to call.       Sincerely,      Dale Kumar DO

## 2022-12-17 NOTE — PROGRESS NOTES
2022     Jacqueline Shaw (:  1995) is a 32 y.o. female, here for evaluation of the following medical concerns:    Sinus Problem  This is a new problem. The current episode started today (started to feel poorly overnight). The problem has been rapidly worsening since onset. Associated symptoms include chills, congestion, coughing, ear pain, headaches, sinus pressure and a sore throat. (Has nausea chronically, but hasn't been vomiting. Having some trouble breathing. Has had low grade temps. Has had body aches and chills.  ) Treatments tried: tylenol sinus congestion PE. The treatment provided no relief. Did review patient's med list, allergies, social history,pmhx and pshx today as noted in the record. Review of Systems   Constitutional:  Positive for chills, fatigue and fever. HENT:  Positive for congestion, ear pain, postnasal drip, rhinorrhea, sinus pressure, sinus pain and sore throat. Respiratory:  Positive for cough. Neurological:  Positive for headaches. Prior to Visit Medications    Medication Sig Taking? Authorizing Provider   ALPRAZolam [place]) 1 MG tablet take 1 tablet by mouth three times a day if needed Yes Wilmar Medina DO   ferrous sulfate (IRON 325) 325 (65 Fe) MG tablet Take 325 mg by mouth daily (with breakfast) Patient reports she just started this herself and only taking one tablet every other day.  Yes Historical Provider, MD   tiZANidine (ZANAFLEX) 4 MG tablet take 1 tablet by mouth every 8 hours if needed for muscle spasm Yes Wilmar Medina DO   albuterol (PROVENTIL) (2.5 MG/3ML) 0.083% nebulizer solution Take 3 mLs by nebulization every 6 hours as needed for Wheezing or Shortness of Breath Yes Wilmar Medina DO   VENTOLIN  (90 Base) MCG/ACT inhaler inhale 1-2 puffs by mouth and INTO THE LUNGS every 6 hours if needed Yes Wilmar Medina DO   busPIRone (BUSPAR) 15 MG tablet take 1 tablet by mouth three times a day Yes Wilmar Medina DO   naproxen (NAPROSYN) 500 MG tablet Take 1 tablet by mouth 2 times daily (with meals) Yes Wilmar Medina DO   Respiratory Therapy Supplies (NEBULIZER) YASH Use as directed for bronchitis. Marjorie Macario, DO        Social History     Tobacco Use    Smoking status: Every Day     Packs/day: 0.50     Years: 3.00     Pack years: 1.50     Types: Cigarettes    Smokeless tobacco: Never   Substance Use Topics    Alcohol use: No        Vitals:    12/16/22 1934   BP: (!) 130/90   Pulse: 96   Temp: 99.3 °F (37.4 °C)   TempSrc: Tympanic   SpO2: 98%   Weight: 195 lb (88.5 kg)   Height: 5' 9\" (1.753 m)     Estimated body mass index is 28.8 kg/m² as calculated from the following:    Height as of this encounter: 5' 9\" (1.753 m). Weight as of this encounter: 195 lb (88.5 kg). Physical Exam  Vitals and nursing note reviewed. Constitutional:       General: She is not in acute distress. Appearance: Normal appearance. She is well-developed. She is not diaphoretic. HENT:      Head: Normocephalic and atraumatic. Right Ear: External ear normal.      Left Ear: External ear normal.      Ears:      Comments: TMs dull with fluid behind the TM     Nose: Congestion and rhinorrhea present. Mouth/Throat:      Pharynx: No posterior oropharyngeal erythema. Comments: Post nasal drainage noted  Eyes:      General: No scleral icterus. Right eye: No discharge. Left eye: No discharge. Conjunctiva/sclera: Conjunctivae normal.      Pupils: Pupils are equal, round, and reactive to light. Neck:      Thyroid: No thyromegaly. Cardiovascular:      Rate and Rhythm: Normal rate and regular rhythm. Heart sounds: Normal heart sounds. Pulmonary:      Effort: Pulmonary effort is normal. No respiratory distress. Breath sounds: Wheezing present. Musculoskeletal:      Cervical back: Normal range of motion and neck supple.    Lymphadenopathy:      Cervical: Cervical adenopathy present. Skin:     General: Skin is warm and dry. Findings: No rash. Neurological:      Mental Status: She is alert and oriented to person, place, and time. Psychiatric:         Behavior: Behavior normal.         Thought Content: Thought content normal.         Judgment: Judgment normal.       ASSESSMENT/PLAN:  Encounter Diagnoses   Name Primary? Influenza A Yes    Body aches      Orders Placed This Encounter   Medications    oseltamivir (TAMIFLU) 75 MG capsule     Sig: Take 1 capsule by mouth 2 times daily     Dispense:  10 capsule     Refill:  0     Orders Placed This Encounter   Procedures    POCT COVID-19 & Influenza A/B     Order Specific Question:   Previously tested for COVID-19? Answer:   Yes     Influenza A/B positive/negative  COVID negative. RX as above. Continue with aerosols as ordered. Increase fluids and rest    Can use mucinex or mucinex DM or comparable for congestion or cough. Return  if no improvement in symptoms or if any further symptoms arise. No follow-ups on file. An electronic signature was used to authenticate this note.     --Marcell Abreu, DO on 12/16/2022 at 7:42 PM

## 2022-12-26 DIAGNOSIS — F41.0 PANIC DISORDER: ICD-10-CM

## 2022-12-26 DIAGNOSIS — F43.10 PTSD (POST-TRAUMATIC STRESS DISORDER): ICD-10-CM

## 2022-12-27 NOTE — TELEPHONE ENCOUNTER
Jennifer called requesting a refill of the below medication which has been pended for you:     Requested Prescriptions     Pending Prescriptions Disp Refills    ALPRAZolam (XANAX) 1 MG tablet [Pharmacy Med Name: ALPRAZOLAM 1 MG TABLET] 90 tablet      Sig: take 1 tablet by mouth three times a day if needed       Last Appointment Date: 10/31/2022  Next Appointment Date: 1/31/2023    Allergies   Allergen Reactions    Codeine Anaphylaxis     Happened when in ER getting arm reset at age 9  States did have to be tubed    Tape [Adhesive Tape] Other (See Comments)     Paper tape causes blisters

## 2022-12-28 RX ORDER — ALPRAZOLAM 1 MG/1
TABLET ORAL
Qty: 90 TABLET | Refills: 0 | Status: SHIPPED | OUTPATIENT
Start: 2022-12-28 | End: 2023-01-27

## 2023-01-23 DIAGNOSIS — F41.0 PANIC DISORDER: ICD-10-CM

## 2023-01-23 DIAGNOSIS — F43.10 PTSD (POST-TRAUMATIC STRESS DISORDER): ICD-10-CM

## 2023-01-23 RX ORDER — ALPRAZOLAM 1 MG/1
TABLET ORAL
Qty: 90 TABLET | Refills: 0 | Status: SHIPPED | OUTPATIENT
Start: 2023-01-23 | End: 2023-02-22

## 2023-01-23 NOTE — TELEPHONE ENCOUNTER
Pharmacy requesting a refill of the below medication which has been pended for you:     Requested Prescriptions     Pending Prescriptions Disp Refills    ALPRAZolam (XANAX) 1 MG tablet [Pharmacy Med Name: ALPRAZOLAM 1 MG TABLET] 90 tablet 0     Sig: take 1 tablet by mouth three times a day if needed       Last Appointment Date: 10/31/2022  Next Appointment Date: 1/31/2023    Allergies   Allergen Reactions    Codeine Anaphylaxis     Happened when in ER getting arm reset at age 9  States did have to be tubed    Tape [Adhesive Tape] Other (See Comments)     Paper tape causes blisters

## 2023-02-01 SDOH — ECONOMIC STABILITY: TRANSPORTATION INSECURITY
IN THE PAST 12 MONTHS, HAS LACK OF TRANSPORTATION KEPT YOU FROM MEETINGS, WORK, OR FROM GETTING THINGS NEEDED FOR DAILY LIVING?: NO

## 2023-02-01 SDOH — ECONOMIC STABILITY: FOOD INSECURITY: WITHIN THE PAST 12 MONTHS, THE FOOD YOU BOUGHT JUST DIDN'T LAST AND YOU DIDN'T HAVE MONEY TO GET MORE.: NEVER TRUE

## 2023-02-01 SDOH — ECONOMIC STABILITY: FOOD INSECURITY: WITHIN THE PAST 12 MONTHS, YOU WORRIED THAT YOUR FOOD WOULD RUN OUT BEFORE YOU GOT MONEY TO BUY MORE.: NEVER TRUE

## 2023-02-01 SDOH — ECONOMIC STABILITY: HOUSING INSECURITY
IN THE LAST 12 MONTHS, WAS THERE A TIME WHEN YOU DID NOT HAVE A STEADY PLACE TO SLEEP OR SLEPT IN A SHELTER (INCLUDING NOW)?: NO

## 2023-02-01 SDOH — ECONOMIC STABILITY: INCOME INSECURITY: HOW HARD IS IT FOR YOU TO PAY FOR THE VERY BASICS LIKE FOOD, HOUSING, MEDICAL CARE, AND HEATING?: NOT HARD AT ALL

## 2023-02-03 ENCOUNTER — OFFICE VISIT (OUTPATIENT)
Dept: INTERNAL MEDICINE | Age: 28
End: 2023-02-03
Payer: MEDICAID

## 2023-02-03 VITALS
WEIGHT: 194 LBS | BODY MASS INDEX: 28.73 KG/M2 | DIASTOLIC BLOOD PRESSURE: 68 MMHG | RESPIRATION RATE: 16 BRPM | SYSTOLIC BLOOD PRESSURE: 108 MMHG | HEART RATE: 72 BPM | HEIGHT: 69 IN

## 2023-02-03 DIAGNOSIS — Z72.0 TOBACCO USE: ICD-10-CM

## 2023-02-03 DIAGNOSIS — F41.0 PANIC DISORDER: ICD-10-CM

## 2023-02-03 DIAGNOSIS — E66.3 OVERWEIGHT: ICD-10-CM

## 2023-02-03 DIAGNOSIS — F43.10 PTSD (POST-TRAUMATIC STRESS DISORDER): Primary | ICD-10-CM

## 2023-02-03 DIAGNOSIS — F17.210 CIGARETTE NICOTINE DEPENDENCE WITHOUT COMPLICATION: ICD-10-CM

## 2023-02-03 PROCEDURE — 99406 BEHAV CHNG SMOKING 3-10 MIN: CPT | Performed by: INTERNAL MEDICINE

## 2023-02-03 PROCEDURE — 99214 OFFICE O/P EST MOD 30 MIN: CPT | Performed by: INTERNAL MEDICINE

## 2023-02-03 PROCEDURE — 99213 OFFICE O/P EST LOW 20 MIN: CPT | Performed by: INTERNAL MEDICINE

## 2023-02-03 ASSESSMENT — PATIENT HEALTH QUESTIONNAIRE - PHQ9
5. POOR APPETITE OR OVEREATING: 0
3. TROUBLE FALLING OR STAYING ASLEEP: 0
SUM OF ALL RESPONSES TO PHQ QUESTIONS 1-9: 0
SUM OF ALL RESPONSES TO PHQ9 QUESTIONS 1 & 2: 0
6. FEELING BAD ABOUT YOURSELF - OR THAT YOU ARE A FAILURE OR HAVE LET YOURSELF OR YOUR FAMILY DOWN: 0
8. MOVING OR SPEAKING SO SLOWLY THAT OTHER PEOPLE COULD HAVE NOTICED. OR THE OPPOSITE, BEING SO FIGETY OR RESTLESS THAT YOU HAVE BEEN MOVING AROUND A LOT MORE THAN USUAL: 0
SUM OF ALL RESPONSES TO PHQ QUESTIONS 1-9: 0
10. IF YOU CHECKED OFF ANY PROBLEMS, HOW DIFFICULT HAVE THESE PROBLEMS MADE IT FOR YOU TO DO YOUR WORK, TAKE CARE OF THINGS AT HOME, OR GET ALONG WITH OTHER PEOPLE: 0
SUM OF ALL RESPONSES TO PHQ QUESTIONS 1-9: 0
SUM OF ALL RESPONSES TO PHQ QUESTIONS 1-9: 0
1. LITTLE INTEREST OR PLEASURE IN DOING THINGS: 0
2. FEELING DOWN, DEPRESSED OR HOPELESS: 0
4. FEELING TIRED OR HAVING LITTLE ENERGY: 0
9. THOUGHTS THAT YOU WOULD BE BETTER OFF DEAD, OR OF HURTING YOURSELF: 0
7. TROUBLE CONCENTRATING ON THINGS, SUCH AS READING THE NEWSPAPER OR WATCHING TELEVISION: 0
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
## 2023-03-02 DIAGNOSIS — F43.10 PTSD (POST-TRAUMATIC STRESS DISORDER): ICD-10-CM

## 2023-03-02 DIAGNOSIS — F41.0 PANIC DISORDER: ICD-10-CM

## 2023-03-03 ENCOUNTER — TELEMEDICINE (OUTPATIENT)
Dept: INTERNAL MEDICINE | Age: 28
End: 2023-03-03
Payer: MEDICAID

## 2023-03-03 VITALS — HEIGHT: 69 IN | WEIGHT: 189 LBS | BODY MASS INDEX: 27.99 KG/M2

## 2023-03-03 DIAGNOSIS — F41.0 PANIC DISORDER: ICD-10-CM

## 2023-03-03 DIAGNOSIS — F60.3 BORDERLINE PERSONALITY DISORDER (HCC): ICD-10-CM

## 2023-03-03 DIAGNOSIS — F33.42 RECURRENT MAJOR DEPRESSIVE DISORDER, IN FULL REMISSION (HCC): ICD-10-CM

## 2023-03-03 DIAGNOSIS — E66.3 OVERWEIGHT: ICD-10-CM

## 2023-03-03 DIAGNOSIS — F43.10 PTSD (POST-TRAUMATIC STRESS DISORDER): Primary | ICD-10-CM

## 2023-03-03 PROCEDURE — 99214 OFFICE O/P EST MOD 30 MIN: CPT | Performed by: INTERNAL MEDICINE

## 2023-03-03 PROCEDURE — 99211 OFF/OP EST MAY X REQ PHY/QHP: CPT | Performed by: INTERNAL MEDICINE

## 2023-03-03 RX ORDER — ALPRAZOLAM 1 MG/1
TABLET ORAL
Qty: 90 TABLET | Refills: 0 | Status: SHIPPED | OUTPATIENT
Start: 2023-03-03 | End: 2023-04-02

## 2023-03-03 NOTE — TELEPHONE ENCOUNTER
Pharmacy requesting a refill of the below medication which has been pended for you:     Requested Prescriptions     Pending Prescriptions Disp Refills    ALPRAZolam (XANAX) 1 MG tablet [Pharmacy Med Name: ALPRAZOLAM 1 MG TABLET] 90 tablet 0     Sig: take 1 tablet by mouth three times a day if needed       Last Appointment Date: 2/3/2023  Next Appointment Date: 3/3/2023    Allergies   Allergen Reactions    Codeine Anaphylaxis     Happened when in ER getting arm reset at age 9  States did have to be tubed    Tape [Adhesive Tape] Other (See Comments)     Paper tape causes blisters

## 2023-03-03 NOTE — TELEPHONE ENCOUNTER
[place]    Controlled Substance Monitoring:    Acute and Chronic Pain Monitoring:   RX Monitoring 3/3/2023   Attestation -   Periodic Controlled Substance Monitoring No signs of potential drug abuse or diversion identified.

## 2023-03-03 NOTE — PROGRESS NOTES
DR. Gillian Simmons - TELEHEALTH PROGRESS NOTE    CHIEF COMPLAINT/HISTORY OF CHIEF COMPLAINT: This 32 y.o.  female presents via [address] visit today to see how her post-traumatic stress disorder has been doing since we started her on Zoloft 50 mg daily. At the time she had been having more frequent panic attacks, which she attributed to post-traumatic stress disorder in addition to anxiety. She had been using her Xanax a lot more often as well. Since beginning the Zoloft she has been feeling a lot better. Her panic attacks have decreased in frequency by a lot and she is only using the Xanax a few times a week now. She is hoping to get to a point where she does not need the Xanax at all. The Zoloft is also helping her depression. There are no other complaints. ALLERGIES/INTOLERANCES:   Allergies   Allergen Reactions    Codeine Anaphylaxis     Happened when in ER getting arm reset at age 9  States did have to be tubed    Tape [Adhesive Tape] Other (See Comments)     Paper tape causes blisters       MEDICATIONS:   Outpatient Medications Marked as Taking for the 3/3/23 encounter (Telemedicine) with Dayana Elias, DO   Medication Sig Dispense Refill    sertraline (ZOLOFT) 50 MG tablet Take 1 tablet by mouth daily Take 25 mg (1/2 tablet) a day for the first 7 days, then increase to 50 mg daily. 30 tablet 5    ferrous sulfate (IRON 325) 325 (65 Fe) MG tablet Take 325 mg by mouth daily (with breakfast) Patient reports she just started this herself and only taking one tablet every other day. tiZANidine (ZANAFLEX) 4 MG tablet take 1 tablet by mouth every 8 hours if needed for muscle spasm 90 tablet 5    albuterol (PROVENTIL) (2.5 MG/3ML) 0.083% nebulizer solution Take 3 mLs by nebulization every 6 hours as needed for Wheezing or Shortness of Breath 120 each 3    Respiratory Therapy Supplies (NEBULIZER) YASH Use as directed for bronchitis.  1 each 0    VENTOLIN  (90 Base) MCG/ACT inhaler inhale 1-2 puffs by mouth and INTO THE LUNGS every 6 hours if needed 18 g 11    busPIRone (BUSPAR) 15 MG tablet take 1 tablet by mouth three times a day 90 tablet 5    naproxen (NAPROSYN) 500 MG tablet Take 1 tablet by mouth 2 times daily (with meals) 30 tablet 11       IMMUNIZATIONS: Reviewed for influenza and pneumococcal status as indicated in electronic record.    REVIEW OF SYSTEMS:     Please see history of chief complaint above; otherwise no new problems with respect to General, HEENT, Cardiovascular, Respiratory, Gastrointestinal, Genitourinary, Endocrinologic, Musculoskeletal, or Neuropsychiatric complaints.        PHYSICAL EXAMINATION:    (Due to this being a TeleHealth encounter, evaluation of the following organ systems is limited: Vitals/General/Skin/HEENT/Lungs/Heart/Abdomen/Genitourinary/Musculoskeletal/Neurologic.    Wt Readings from Last 2 Encounters:   03/03/23 189 lb (85.7 kg)   02/03/23 194 lb (88 kg)       Patient-Reported Vitals 3/3/2023   Patient-Reported Weight 189 lb   Patient-Reported Height 5' 9\"   Patient-Reported Systolic -   Patient-Reported Diastolic -   Patient-Reported Pulse 80   Patient-Reported Temperature -     Body mass index is 27.91 kg/m².    General: This is a 27 y.o.  female who is alert and oriented to person, place, and time. She appears to be her stated age and does not appear to be in any acute distress. She was able to follow commands. Affect appropriate for the situation. There were no hallucinations.  Skin: Skin color, texture, turgor appears normal. No apparent rashes or lesions.  HEENT/Neck: Head: Normal, normocephalic, atraumatic  Eye: Normal appearing external eye, conjunctiva, lids cornea. EOM appear intact bilaterally.  Ears: Normal appearing external ears.  Nose: Normal appearing external nose. No discharge.  Pharynx: Mucous membranes appear moist.  Neck: No masses visualized.  Pulmonary/Chest: Chest rises and falls symmetrically with inspiration and  expiration. No accessory muscle use noted. Normal respiratory effort. No signs of difficulty breathing or respiratory distress visualized. Abdomen: Non-obese  Musculoskeletal: Normal appearing range of motion of neck. Gait appears normal with no signs of ataxia. Extremities: Within the limitations of TeleHealth examination there does not appear to be any clubbing, cyanosis, or edema in any of the extremities. Neurologic: No gaze palsy. No facial asymmetry (Cranial Nerve 7 motor function). Exam limited due to video visit. Osteopathic Structural Examination: Unable to perform due to limitations of Telehealth. ASSESSMENT/PLAN:    1. PTSD (post-traumatic stress disorder), improving  2. Recurrent major depressive disorder, in full remission ([facility] [address] stable  3. Borderline personality disorder ([facility] [address] stable  4. Panic disorder, improving  - She is doing much better since starting the Zoloft  - She will continue to take the Zoloft at the current dose of 50 mg daily  - She will continue to take the Buspar and the Xanax for now  - She will continue to work on decreasing the amount of Xanax that she takes    5. Overweight, stable  - We discussed weight loss  - She will continue to watch her diet and exercise       No orders of the defined types were placed in this encounter. Requested Prescriptions      No prescriptions requested or ordered in this encounter       She seems to be doing fairly well with regards to her chronic health problems so we are not going to make any changes to her medications. Return in about 3 months (around 6/3/2023).       Pursuant to the emergency declaration under the [address] [place] [address] authority and the [employer] and [employer] Act, this TeleHealth visit was conducted, with patient's (and/or their legal guardian's, if applicable) consent, to reduce the patient's risk of exposure to COVID-19 and provide continuity of care for an established patient. Services were provided through a synchronous (real-time) audio-video encounter (using [employer] Virtual Visits) to substitute for in-person clinic visit. The patient (and/or guardian) is aware that this is a billable service, which includes applicable co-pays. The originating site was the patient's home and the distant site was the provider's office. The patient was physically present in a state where the provider is licensed to provide care. Patient's identity was verified via name and date of birth.          Electronically signed by Cassidy Ryan DO on 3/3/2023 at 12:05 PM  Internal Medicine

## 2023-03-30 ENCOUNTER — TELEPHONE (OUTPATIENT)
Dept: INTERNAL MEDICINE | Age: 28
End: 2023-03-30

## 2023-03-30 DIAGNOSIS — F43.10 PTSD (POST-TRAUMATIC STRESS DISORDER): ICD-10-CM

## 2023-03-30 DIAGNOSIS — N83.201 CYST OF RIGHT OVARY: Primary | ICD-10-CM

## 2023-03-30 DIAGNOSIS — F41.0 PANIC DISORDER: ICD-10-CM

## 2023-03-30 RX ORDER — ALPRAZOLAM 1 MG/1
TABLET ORAL
Qty: 90 TABLET | Refills: 0 | Status: SHIPPED | OUTPATIENT
Start: 2023-03-30 | End: 2023-04-29

## 2023-03-30 RX ORDER — TRAMADOL HYDROCHLORIDE 50 MG/1
50 TABLET ORAL EVERY 8 HOURS PRN
Qty: 21 TABLET | Refills: 0 | Status: SHIPPED | OUTPATIENT
Start: 2023-03-30 | End: 2023-04-06

## 2023-03-30 NOTE — TELEPHONE ENCOUNTER
Was seen in [address] ER on 3/28/23 for low adominal pain and bleeding. They did a CT which showed a cyst on her right ovary. She is having a lot of pain and bleeding, passing occasional clots the size of a nickel or less. They set her up with her OB/GYN for April 17th and wonders if she should see anyone sooner? Thoughts?

## 2023-03-30 NOTE — TELEPHONE ENCOUNTER
The emergency room doctor has not finished her report yet so I can't see what they did for her. Was she given any prescriptions for pain medicine? Is it helping? Would she be willing to see one of our gynecologists here if they could see her sooner?

## 2023-03-30 NOTE — TELEPHONE ENCOUNTER
Jennifer called requesting a refill of the below medication which has been pended for you:     Requested Prescriptions     Pending Prescriptions Disp Refills    ALPRAZolam (XANAX) 1 MG tablet [Pharmacy Med Name: ALPRAZOLAM 1 MG TABLET] 90 tablet      Sig: take 1 tablet by mouth three times a day if needed       Last Appointment Date: 3/3/2023  Next Appointment Date: 6/5/2023    Allergies   Allergen Reactions    Codeine Anaphylaxis     Happened when in ER getting arm reset at age 9  States did have to be tubed    Tape [Adhesive Tape] Other (See Comments)     Paper tape causes blisters

## 2023-03-30 NOTE — TELEPHONE ENCOUNTER
Patient informed of Rx being sent to pharmacy and also if her symptoms worsen to go to ER. Patient verbalized understanding.

## 2023-03-30 NOTE — TELEPHONE ENCOUNTER
They gave her a shot of Toradol while in the ER and then sent her home without meds. Told her to alternate Tylenol and Motrin. She has been taking Ibuprofen 400mg and One 500mg. Naproxen together yest. Only took 200mg Ibuprofen and one 500mg. Naproxen today. Not helping at all. Uses [facility]

## 2023-04-25 DIAGNOSIS — F41.0 PANIC DISORDER: ICD-10-CM

## 2023-04-25 DIAGNOSIS — F43.10 PTSD (POST-TRAUMATIC STRESS DISORDER): ICD-10-CM

## 2023-04-26 RX ORDER — ALPRAZOLAM 1 MG/1
TABLET ORAL
Qty: 90 TABLET | Refills: 0 | Status: SHIPPED | OUTPATIENT
Start: 2023-04-26 | End: 2023-05-26

## 2023-04-26 NOTE — TELEPHONE ENCOUNTER
[place]AR[place] checked today    Controlled Substance Monitoring:    Acute and Chronic Pain Monitoring:   RX Monitoring 4/26/2023   Attestation -   Periodic Controlled Substance Monitoring No signs of potential drug abuse or diversion identified.

## 2023-05-24 RX ORDER — NAPROXEN 500 MG/1
TABLET ORAL
Qty: 30 TABLET | Refills: 11 | Status: SHIPPED | OUTPATIENT
Start: 2023-05-24

## 2023-06-08 ENCOUNTER — TELEMEDICINE (OUTPATIENT)
Dept: INTERNAL MEDICINE | Age: 28
End: 2023-06-08
Payer: MEDICAID

## 2023-06-08 VITALS — WEIGHT: 180 LBS | BODY MASS INDEX: 26.66 KG/M2 | HEIGHT: 69 IN

## 2023-06-08 DIAGNOSIS — Z72.0 TOBACCO USE: ICD-10-CM

## 2023-06-08 DIAGNOSIS — F60.3 BORDERLINE PERSONALITY DISORDER (HCC): ICD-10-CM

## 2023-06-08 DIAGNOSIS — F43.10 PTSD (POST-TRAUMATIC STRESS DISORDER): Primary | ICD-10-CM

## 2023-06-08 DIAGNOSIS — F17.210 CIGARETTE NICOTINE DEPENDENCE WITHOUT COMPLICATION: ICD-10-CM

## 2023-06-08 DIAGNOSIS — F41.0 PANIC DISORDER: ICD-10-CM

## 2023-06-08 DIAGNOSIS — J45.20 MILD INTERMITTENT ASTHMA WITHOUT COMPLICATION: ICD-10-CM

## 2023-06-08 DIAGNOSIS — E66.3 OVERWEIGHT: ICD-10-CM

## 2023-06-08 DIAGNOSIS — F33.42 RECURRENT MAJOR DEPRESSIVE DISORDER, IN FULL REMISSION (HCC): ICD-10-CM

## 2023-06-08 PROCEDURE — 99406 BEHAV CHNG SMOKING 3-10 MIN: CPT | Performed by: INTERNAL MEDICINE

## 2023-06-08 PROCEDURE — 99211 OFF/OP EST MAY X REQ PHY/QHP: CPT | Performed by: INTERNAL MEDICINE

## 2023-06-08 RX ORDER — ALPRAZOLAM 1 MG/1
1 TABLET ORAL 3 TIMES DAILY PRN
COMMUNITY

## 2023-06-08 NOTE — PROGRESS NOTES
Jennifer received counseling on the following healthy behaviors: nutrition, exercise, and tobacco cessation  Reviewed prior labs and health maintenance  Continue current medications, diet and exercise. Discussed use, benefit, and side effects of prescribed medications. Barriers to medication compliance addressed. Patient given educational materials - see patient instructions  Was a self-tracking handout given in paper form or via [employer]? No: not indicated     Requested Prescriptions      No prescriptions requested or ordered in this encounter       All patient questions answered. Patient voiced understanding. Quality Measures    Body mass index is 26.58 kg/m². Elevated. Weight control plan discussed: Healthy diet and regular exercise. Blood pressure not measured. Treatment plan: See main progress note. No results found for: LDLCALC, LDLCHOLESTEROL, LDLDIRECT (goal LDL reduction with dx if diabetes is 50% LDL reduction)      PHQ Scores 2/3/2023 7/29/2022   PHQ2 Score 0 2   PHQ9 Score 0 3     See progress note for plan, if depression exists. Interpretation of Total Score: Major depression if the answer to questions 1 or 2 and 5 or more of questions 1 to 9 are at least [facility] than half the days. \"  Other depressive syndrome if questions 1 or 2 and two, three, or four of questions 1 to 9 are at least [facility] than half the days\"   Depression Severity: 5-9 = Mild depression, 10-14 = Moderate depression, 15-19 = Moderately severe depression, 20-27 = Severe depression
including information about personal health harms, usage of appropriate cessation measures and benefits of cessation. Time spent (minutes): 5 minutes   - OK TOBACCO USE CESSATION INTERMEDIATE 3-10 MINUTES      Orders Placed This Encounter   Procedures    OK TOBACCO USE CESSATION INTERMEDIATE 3-10 MINUTES       Requested Prescriptions      No prescriptions requested or ordered in this encounter       She seems to be doing fairly well with regards to her chronic health problems so we are not going to make any changes to her medications. Return in about 3 months (around 9/8/2023). This TeleHealth visit was conducted, with patient's (and/or their legal guardian's, if applicable) consent, to provide continuity of care for an established patient. Services were provided through a synchronous (real-time) audio-video encounter (using [employer] Virtual Visits) to substitute for in-person clinic visit. The patient (and/or guardian) is aware that this is a billable service, which includes applicable co-pays. The originating site was the patient's home and the distant site was the provider's office. The patient was physically present in a state where the provider is licensed to provide care. Patient's identity was verified via name and date of birth.           Electronically signed by Emily Romero DO on 6/8/2023 at 3:43 PM  Internal Medicine

## 2023-06-19 DIAGNOSIS — F41.0 PANIC DISORDER: Primary | ICD-10-CM

## 2023-06-20 NOTE — TELEPHONE ENCOUNTER
Jennifer called requesting a refill of the below medication which has been pended for you:     Requested Prescriptions     Pending Prescriptions Disp Refills    ALPRAZolam (XANAX) 1 MG tablet [Pharmacy Med Name: ALPRAZOLAM 1 MG TABLET] 90 tablet      Sig: take 1 tablet by mouth three times a day if needed       Last Appointment Date: 6/8/2023  Next Appointment Date: 9/7/2023    Allergies   Allergen Reactions    Codeine Anaphylaxis     Happened when in ER getting arm reset at age 9  States did have to be tubed    Tape [Adhesive Tape] Other (See Comments)     Paper tape causes blisters

## 2023-06-21 ENCOUNTER — TELEPHONE (OUTPATIENT)
Dept: INTERNAL MEDICINE | Age: 28
End: 2023-06-21

## 2023-06-21 DIAGNOSIS — N83.209 CYST OF OVARY, UNSPECIFIED LATERALITY: Primary | ICD-10-CM

## 2023-06-21 RX ORDER — ALPRAZOLAM 1 MG/1
TABLET ORAL
Qty: 90 TABLET | Refills: 0 | Status: SHIPPED | OUTPATIENT
Start: 2023-06-21 | End: 2023-07-21

## 2023-06-21 RX ORDER — TRAMADOL HYDROCHLORIDE 50 MG/1
50 TABLET ORAL EVERY 8 HOURS PRN
Qty: 15 TABLET | Refills: 0 | Status: SHIPPED | OUTPATIENT
Start: 2023-06-21 | End: 2023-06-23 | Stop reason: SDUPTHER

## 2023-06-21 NOTE — TELEPHONE ENCOUNTER
Patient called in stating that she has a very large cyst and her OBGYN will not give her anything for pain. Patient reports this happened last time too and she just needs something to help get her by. If agreeable she would like something sent into [facility] [id number]. Please advise.

## 2023-06-21 NOTE — TELEPHONE ENCOUNTER
OARRS checked today    Controlled Substance Monitoring:    Acute and Chronic Pain Monitoring:   RX Monitoring 6/21/2023   Attestation -   Periodic Controlled Substance Monitoring No signs of potential drug abuse or diversion identified.

## 2023-06-21 NOTE — TELEPHONE ENCOUNTER
Will try tramadol 50 mg every 8 hours as needed for pain. Prescription sent. OARRS checked today    Controlled Substance Monitoring:    Acute and Chronic Pain Monitoring:   RX Monitoring 6/21/2023   Attestation -   Periodic Controlled Substance Monitoring No signs of potential drug abuse or diversion identified.

## 2023-06-23 ENCOUNTER — TELEPHONE (OUTPATIENT)
Dept: INTERNAL MEDICINE | Age: 28
End: 2023-06-23

## 2023-06-23 DIAGNOSIS — N83.209 CYST OF OVARY, UNSPECIFIED LATERALITY: ICD-10-CM

## 2023-06-23 RX ORDER — TRAMADOL HYDROCHLORIDE 50 MG/1
50 TABLET ORAL EVERY 6 HOURS PRN
Qty: 28 TABLET | Refills: 0 | Status: SHIPPED | OUTPATIENT
Start: 2023-06-23 | End: 2023-06-30

## 2023-06-23 NOTE — TELEPHONE ENCOUNTER
I sent in for more tramadol and increased it to every 6 hours a day as needed and gave her enough for 7 days. Once she has the surgery date she should let me know so we can plan accordingly.

## 2023-06-23 NOTE — TELEPHONE ENCOUNTER
Saw Dr Mary Woodall at [facility] today. They did a bunch of bloodwork and told her she has an ovarian cyst that is dense with thick wall 4.cm and is recommending surgery. Patient is in a lot of pain. Using heating pad. The doctor refused to give her anything for pain. States that the state is coming down on doctors giving out pain meds. She needs something for pain. So asking if you would recommend something or refill pain meds?   Uses [facility].

## 2023-06-27 ENCOUNTER — TELEPHONE (OUTPATIENT)
Dept: INTERNAL MEDICINE | Age: 28
End: 2023-06-27

## 2023-07-10 RX ORDER — BUSPIRONE HYDROCHLORIDE 15 MG/1
TABLET ORAL
Qty: 90 TABLET | Refills: 5 | Status: SHIPPED | OUTPATIENT
Start: 2023-07-10

## 2023-07-10 NOTE — TELEPHONE ENCOUNTER
Pharmacy requesting a refill of the below medication which has been pended for you:     Requested Prescriptions     Pending Prescriptions Disp Refills    busPIRone (BUSPAR) 15 MG tablet [Pharmacy Med Name: BUSPIRONE HCL 15 MG TABLET] 90 tablet 5     Sig: take 1 tablet by mouth three times a day       Last Appointment Date: 6/8/2023  Next Appointment Date: 9/7/2023    Allergies   Allergen Reactions    Codeine Anaphylaxis     Happened when in ER getting arm reset at age 9  States did have to be tubed    Tape [Adhesive Tape] Other (See Comments)     Paper tape causes blisters

## 2023-07-19 DIAGNOSIS — F41.0 PANIC DISORDER: ICD-10-CM

## 2023-07-19 RX ORDER — ALPRAZOLAM 1 MG/1
TABLET ORAL
Qty: 90 TABLET | Refills: 0 | Status: SHIPPED | OUTPATIENT
Start: 2023-07-19 | End: 2023-09-08 | Stop reason: SDUPTHER

## 2023-08-07 RX ORDER — TIZANIDINE 4 MG/1
TABLET ORAL
Qty: 90 TABLET | Refills: 5 | Status: SHIPPED | OUTPATIENT
Start: 2023-08-07

## 2023-08-07 NOTE — TELEPHONE ENCOUNTER
Pharmacy requesting a refill of the below medication which has been pended for you:     Requested Prescriptions     Pending Prescriptions Disp Refills    tiZANidine (ZANAFLEX) 4 MG tablet [Pharmacy Med Name: TIZANIDINE HCL 4 MG TABLET] 90 tablet 5     Sig: take 1 tablet by mouth every 8 hours if needed for muscle spasm       Last Appointment Date: 6/8/2023  Next Appointment Date: 9/7/2023    Allergies   Allergen Reactions    Codeine Anaphylaxis     Happened when in ER getting arm reset at age 9  States did have to be tubed    Tape [Adhesive Tape] Other (See Comments)     Paper tape causes blisters

## 2023-09-08 DIAGNOSIS — F41.0 PANIC DISORDER: ICD-10-CM

## 2023-09-08 RX ORDER — ALPRAZOLAM 1 MG/1
TABLET ORAL
Qty: 90 TABLET | Refills: 0 | Status: SHIPPED | OUTPATIENT
Start: 2023-09-08 | End: 2023-10-08

## 2023-09-08 NOTE — TELEPHONE ENCOUNTER
OARRS checked today    Controlled Substance Monitoring:    Acute and Chronic Pain Monitoring:   RX Monitoring 9/8/2023   Attestation -   Periodic Controlled Substance Monitoring No signs of potential drug abuse or diversion identified.

## 2023-09-08 NOTE — TELEPHONE ENCOUNTER
Pharmacy faxed requesting a refill of the below medication which has been pended for you:     Requested Prescriptions     Pending Prescriptions Disp Refills    ALPRAZolam (XANAX) 1 MG tablet 90 tablet 0     Sig: take 1 tablet by mouth three times a day if needed       Last Appointment Date: 6/8/2023  Next Appointment Date: 9/15/2023    Allergies   Allergen Reactions    Codeine Anaphylaxis     Happened when in ER getting arm reset at age 9  States did have to be tubed    Tape [Adhesive Tape] Other (See Comments)     Paper tape causes blisters

## 2023-09-14 NOTE — PROGRESS NOTES
DR. Leonid Weston - TELEHEALTH PROGRESS NOTE    CHIEF COMPLAINT/HISTORY OF CHIEF COMPLAINT: This 22 y.o.  female presents via TeleHealth visit today for ongoing evaluation and management of her psychiatric conditions. She is doing fairly well lately. She had a baby somewhat recently and is doing better. She is having a problem where her intrauterine device has invaded the uterine wall and she is going to be going to surgery at [facility] in a couple of days to have that removed. The Xanax that she takes does help her anxiety quite a bit and she needs a refill for it. There are no other complaints. ALLERGIES/INTOLERANCES:   Allergies   Allergen Reactions    Codeine Anaphylaxis    Tape Corey Pares Tape] Other (See Comments)     Paper tape causes blisters       MEDICATIONS:   Outpatient Medications Marked as Taking for the 9/28/20 encounter (Virtual Visit) with Brenden Hunt, DO   Medication Sig Dispense Refill    ALPRAZolam (XANAX) 1 MG tablet take 1 tablet by mouth three times a day if needed 90 tablet 0    albuterol sulfate  (90 Base) MCG/ACT inhaler inhale 1 to 2 puffs by mouth every 6 hours if needed 18 g 11    ibuprofen (IBU) 600 MG tablet Take 1 tablet by mouth every 6 hours as needed for Pain 120 tablet 0       IMMUNIZATIONS: Reviewed for influenza and pneumococcal status as indicated in electronic record. REVIEW OF SYSTEMS:     Please see history of chief complaint above; otherwise no new problems with respect to General, HEENT, Cardiovascular, Respiratory, Gastrointestinal, Genitourinary, Endocrinologic, Musculoskeletal, or Neuropsychiatric complaints. PHYSICAL EXAMINATION:    (Due to this being a TeleHealth encounter, evaluation of the following organ systems is limited: Vitals/General/Skin/HEENT/Lungs/Heart/Abdomen/Genitourinary/Musculoskeletal/Neurologic.     Wt Readings from Last 2 Encounters:   08/08/19 217 lb (98.4 kg)   07/12/19 222 lb 6.4 oz (100.9 kg)       Patient-Reported Vitals 9/28/2020   Patient-Reported Weight 230 lb   Patient-Reported Height 5' 9\"   Patient-Reported Systolic 219   Patient-Reported Diastolic 81   Patient-Reported Pulse 99   Patient-Reported Temperature 98.1        There is no height or weight on file to calculate BMI. General: This is a 22 y.o.  female who is alert and oriented to person, place and time. She appears to be her stated age and does not appear to be in any acute distress. She was able to follow commands. Affect appropriate for the situation. There were no hallucinations. Skin: Skin color, texture, turgor appears normal. No apparent rashes or lesions. HEENT/Neck: Head: Normal, normocephalic, atraumatic  Eye: Normal appearing external eye, conjunctiva, lids cornea. EOM appear intact bilaterally. Ears: Normal appearing external ears. Nose: Normal appearing external nose. No discharge. Pharynx: Mucous membranes appear moist.  Neck: No masses visualized. Pulmonary/Chest: Chest rises and falls symmetrically with inspiration and expiration. No accessory muscle use noted. Normal respiratory effort. No signs of difficulty breathing or respiratory distress visualized. Abdomen: Obese  Musculoskeletal: Normal appearing range of motion of neck. Extremities: Within the limitations of TeleHealth examination there does not appear to be any clubbing, cyanosis, or edema in any of the extremities. Neurologic: No gaze palsy. No facial asymmetry (Cranial Nerve 7 motor function). Exam limited due to video visit. Osteopathic Structural Examination: Unable to perform due to limitations of Telehealth. ASSESSMENT/PLAN:    1. Bipolar disease, chronic ([address] stable  2. Borderline personality disorder ([address] stable  3. Panic disorder, stable  4. PTSD (post-traumatic stress disorder), stable  - We will continue to monitor   - We will send in the refill for her Xanax    5.  Class 1 obesity without serious comorbidity with body mass index (BMI) of 32.0 to 32.9 in adult, unspecified obesity type, improved  - We discussed weight loss  - She will continue to watch her diet and exercise       No orders of the defined types were placed in this encounter. Requested Prescriptions      No prescriptions requested or ordered in this encounter       She seems to be doing fairly well with regards to her chronic health problems so we are not going to make any changes to her medications. Return in about 4 months (around 1/28/2021). Pursuant to the emergency declaration under the [address] [place] [address], [facility] waiver authority and the [employer] Act, this TelehHealth visit was conducted, with patient's consent, to reduce the patient's risk of exposure to COVID-19 and provide continuity of care for an established patient. Services were provided through a video synchronous discussion virtually (using [email or web address] me) to substitute for in-person clinic visit. The originating site was the patient's home and the distant site was the provider's office. Patient's identity was verified via name and date of birth. Total time spent on the encounter was 20 minutes.           Electronically signed by Gabriela Meza DO on 9/28/2020 at [facility]  Internal Medicine show

## 2023-09-15 ENCOUNTER — OFFICE VISIT (OUTPATIENT)
Dept: INTERNAL MEDICINE | Age: 28
End: 2023-09-15
Payer: COMMERCIAL

## 2023-09-15 VITALS
SYSTOLIC BLOOD PRESSURE: 128 MMHG | BODY MASS INDEX: 26.96 KG/M2 | HEART RATE: 88 BPM | WEIGHT: 182 LBS | DIASTOLIC BLOOD PRESSURE: 70 MMHG | RESPIRATION RATE: 16 BRPM | HEIGHT: 69 IN

## 2023-09-15 DIAGNOSIS — N80.9 ENDOMETRIOSIS: ICD-10-CM

## 2023-09-15 DIAGNOSIS — F17.210 CIGARETTE NICOTINE DEPENDENCE WITHOUT COMPLICATION: ICD-10-CM

## 2023-09-15 DIAGNOSIS — J45.20 MILD INTERMITTENT ASTHMA WITHOUT COMPLICATION: ICD-10-CM

## 2023-09-15 DIAGNOSIS — F33.42 RECURRENT MAJOR DEPRESSIVE DISORDER, IN FULL REMISSION (HCC): ICD-10-CM

## 2023-09-15 DIAGNOSIS — F43.10 PTSD (POST-TRAUMATIC STRESS DISORDER): Primary | ICD-10-CM

## 2023-09-15 DIAGNOSIS — Z72.0 TOBACCO USE: ICD-10-CM

## 2023-09-15 DIAGNOSIS — F60.3 BORDERLINE PERSONALITY DISORDER (HCC): ICD-10-CM

## 2023-09-15 DIAGNOSIS — E66.3 OVERWEIGHT: ICD-10-CM

## 2023-09-15 DIAGNOSIS — F41.0 PANIC DISORDER: ICD-10-CM

## 2023-09-15 PROCEDURE — 99406 BEHAV CHNG SMOKING 3-10 MIN: CPT | Performed by: INTERNAL MEDICINE

## 2023-09-15 PROCEDURE — 99213 OFFICE O/P EST LOW 20 MIN: CPT | Performed by: INTERNAL MEDICINE

## 2023-09-15 NOTE — PROGRESS NOTES
DR. Terence Mcknight - PROGRESS NOTE    CHIEF COMPLAINT/HISTORY OF CHIEF COMPLAINT: This 29 y.o.  female comes in today for ongoing evaluation and management of her asthma, post-traumatic stress disorder, and anxiety. She had some ovarian cysts removed by Dr. Bobbi Marino at [facility] last month. He told her that she likely has endometriosis as well. She will go back to see him in the near future and she will find out if he recommends doing a hysterectomy or not. Her last visit with him before the surgery was in July. She takes Zoloft for the post-traumatic stress disorder, which is still helping quite a bit, when she remembers to take it (she doesn't always remember). She uses Buspar and Xanax for anxiety. She has been using less and less Xanax now. She uses Proventil aerosols and a Ventolin HFA inhaler as needed for her asthma, which has been stable. Otherwise she seems to be doing fairly well and denies any other complaints. She is still smoking a little more than 1/2 pack of cigarettes a day.       ALLERGIES/INTOLERANCES:   Allergies   Allergen Reactions    Codeine Anaphylaxis     Happened when in ER getting arm reset at age 9  States did have to be tubed    Tape [Adhesive Tape] Other (See Comments)     Paper tape causes blisters       MEDICATIONS:   Outpatient Medications Marked as Taking for the 9/15/23 encounter (Office Visit) with Nash Ornelas, DO   Medication Sig Dispense Refill    ALPRAZolam (XANAX) 1 MG tablet take 1 tablet by mouth three times a day if needed 90 tablet 0    tiZANidine (ZANAFLEX) 4 MG tablet take 1 tablet by mouth every 8 hours if needed for muscle spasm 90 tablet 5    busPIRone (BUSPAR) 15 MG tablet take 1 tablet by mouth three times a day 90 tablet 5    naproxen (NAPROSYN) 500 MG tablet take 1 tablet by mouth twice a day with meals 30 tablet 11    sertraline (ZOLOFT) 50 MG tablet Take 1 tablet by mouth daily Take 25 mg (1/2 tablet) a day for the first

## 2023-09-28 ENCOUNTER — HOSPITAL ENCOUNTER (EMERGENCY)
Age: 28
Discharge: HOME | End: 2023-09-28
Attending: EMERGENCY MEDICINE
Payer: COMMERCIAL

## 2023-09-28 VITALS
RESPIRATION RATE: 18 BRPM | WEIGHT: 182 LBS | SYSTOLIC BLOOD PRESSURE: 114 MMHG | BODY MASS INDEX: 26.96 KG/M2 | HEIGHT: 69 IN | DIASTOLIC BLOOD PRESSURE: 86 MMHG | OXYGEN SATURATION: 98 % | TEMPERATURE: 97.6 F | HEART RATE: 94 BPM

## 2023-09-28 DIAGNOSIS — K08.89 PAIN, DENTAL: Primary | ICD-10-CM

## 2023-09-28 DIAGNOSIS — K02.9 DENTAL CARIES: ICD-10-CM

## 2023-09-28 PROCEDURE — 99283 EMERGENCY DEPT VISIT LOW MDM: CPT

## 2023-09-28 PROCEDURE — 6370000000 HC RX 637 (ALT 250 FOR IP): Performed by: EMERGENCY MEDICINE

## 2023-09-28 RX ORDER — HYDROCODONE BITARTRATE AND ACETAMINOPHEN 5; 325 MG/1; MG/1
1 TABLET ORAL EVERY 6 HOURS PRN
Qty: 10 TABLET | Refills: 0 | Status: SHIPPED | OUTPATIENT
Start: 2023-09-28 | End: 2023-10-01

## 2023-09-28 RX ORDER — PENICILLIN V POTASSIUM 500 MG/1
500 TABLET ORAL 3 TIMES DAILY
Qty: 21 TABLET | Refills: 0 | Status: SHIPPED | OUTPATIENT
Start: 2023-09-28 | End: 2023-10-05

## 2023-09-28 RX ORDER — HYDROCODONE BITARTRATE AND ACETAMINOPHEN 5; 325 MG/1; MG/1
1 TABLET ORAL ONCE
Status: COMPLETED | OUTPATIENT
Start: 2023-09-28 | End: 2023-09-28

## 2023-09-28 RX ADMIN — HYDROCODONE BITARTRATE AND ACETAMINOPHEN 1 TABLET: 5; 325 TABLET ORAL at 05:27

## 2023-09-28 ASSESSMENT — PAIN DESCRIPTION - LOCATION
LOCATION: TEETH
LOCATION: TEETH

## 2023-09-28 ASSESSMENT — PAIN - FUNCTIONAL ASSESSMENT
PAIN_FUNCTIONAL_ASSESSMENT: ACTIVITIES ARE NOT PREVENTED
PAIN_FUNCTIONAL_ASSESSMENT: 0-10
PAIN_FUNCTIONAL_ASSESSMENT: ACTIVITIES ARE NOT PREVENTED

## 2023-09-28 ASSESSMENT — PAIN DESCRIPTION - DESCRIPTORS
DESCRIPTORS: ACHING
DESCRIPTORS: ACHING

## 2023-09-28 ASSESSMENT — PAIN DESCRIPTION - PAIN TYPE
TYPE: ACUTE PAIN
TYPE: ACUTE PAIN

## 2023-09-28 ASSESSMENT — PAIN SCALES - GENERAL
PAINLEVEL_OUTOF10: 10
PAINLEVEL_OUTOF10: 10

## 2023-09-28 ASSESSMENT — LIFESTYLE VARIABLES
HOW OFTEN DO YOU HAVE A DRINK CONTAINING ALCOHOL: NEVER
HOW MANY STANDARD DRINKS CONTAINING ALCOHOL DO YOU HAVE ON A TYPICAL DAY: PATIENT DOES NOT DRINK

## 2023-09-28 ASSESSMENT — ENCOUNTER SYMPTOMS
SORE THROAT: 0
SHORTNESS OF BREATH: 0

## 2023-09-28 NOTE — ED PROVIDER NOTES
[facility] ED  [address] [facility]  [place] [address] [facility]  Phone: [phone]  eMERGENCY dEPARTMENT eNCOUnter      Pt Name: Darshan Diez  MRN: [id number]  [address] [facility] 1995  Date of evaluation: 9/28/23      CHIEF COMPLAINT     Chief Complaint   Patient presents with    Dental Pain       HISTORY OF PRESENT ILLNESS    Jennifer Franz is a 29 y.o. female who presents today with several days of worsening dental pain. She states that she has taken ibuprofen 800 mg as well as Tylenol at home. She has not had adequate relief. No associated fevers chills nausea vomiting difficulty with secretions chest pain or shortness of breath. States that she is trying to see a dentist for dental extraction and they are not able to see her until January. She did start some antibiotic that she had at home of penicillin for this. She has been taking it twice a day. REVIEW OF SYSTEMS       Review of Systems   Constitutional:  Negative for fever. HENT:  Negative for sore throat. Respiratory:  Negative for shortness of breath. Cardiovascular:  Negative for chest pain. All other systems reviewed and are negative. PAST MEDICAL HISTORY    has a past medical history of Anxiety, Bipolar disease, chronic ([address]), Borderline personality disorder ([address]), Bronchitis, Depression, Endometriosis, Gallbladder disease, History of marijuana use, History of methamphetamine use, Ovarian cyst, Panic disorder, PTSD (post-traumatic stress disorder), and Tobacco abuse. SURGICAL HISTORY      has a past surgical history that includes ERCP (2014); Cholecystectomy, laparoscopic (2014); and ovarian cyst removal (Bilateral, 08/31/2023).     CURRENT MEDICATIONS       Discharge Medication List as of 9/28/2023  5:29 AM        CONTINUE these medications which have NOT CHANGED    Details   sertraline (ZOLOFT) 50 MG tablet Take 1 tablet by mouth daily, Disp-30 tablet, R-11Normal      ALPRAZolam (XANAX) 1 MG tablet take 1 tablet by mouth

## 2023-09-28 NOTE — DISCHARGE INSTRUCTIONS
Please follow-up with your PCP or dentist within 2 days. Medications as prescribed. Do not take opioids with alcohol or benzodiazepines. Warm salt water gargles 4 times a day. Soft diet. Avoid foods that are hot or cold and temperature. Return to the emergency department for facial swelling or redness of fevers inability to open the mouth trouble with secretions, or any other acute concerns.

## 2023-10-18 DIAGNOSIS — F41.0 PANIC DISORDER: ICD-10-CM

## 2023-10-18 RX ORDER — ALPRAZOLAM 1 MG/1
TABLET ORAL
Qty: 90 TABLET | Refills: 0 | Status: SHIPPED | OUTPATIENT
Start: 2023-10-18 | End: 2023-11-18

## 2023-10-18 NOTE — TELEPHONE ENCOUNTER
OARRS checked today    Controlled Substance Monitoring:    Acute and Chronic Pain Monitoring:   RX Monitoring Periodic Controlled Substance Monitoring   10/18/2023   7:06 PM No signs of potential drug abuse or diversion identified.

## 2023-10-18 NOTE — TELEPHONE ENCOUNTER
Pharmacy requesting a refill of the below medication which has been pended for you:     Requested Prescriptions     Pending Prescriptions Disp Refills    ALPRAZolam (XANAX) 1 MG tablet [Pharmacy Med Name: ALPRAZOLAM 1 MG TABLET] 90 tablet 0     Sig: take 1 tablet by mouth three times a day if needed       Last Appointment Date: 9/15/2023  Next Appointment Date: 3/15/2024    Allergies   Allergen Reactions    Codeine Anaphylaxis     Happened when in ER getting arm reset at age 9  States did have to be tubed    Tape [Adhesive Tape] Other (See Comments)     Paper tape causes blisters

## 2023-11-10 RX ORDER — ALBUTEROL SULFATE 90 UG/1
AEROSOL, METERED RESPIRATORY (INHALATION)
Qty: 18 G | Refills: 11 | Status: SHIPPED | OUTPATIENT
Start: 2023-11-10

## 2023-11-10 NOTE — TELEPHONE ENCOUNTER
Patient called in requesting refill of inhaler to [facility]. Please advise. See pended order.

## 2023-11-10 NOTE — TELEPHONE ENCOUNTER
Patient informed. Left detailed message on patient's voicemail stating that it was sent to the pharmacy.

## 2023-11-13 ENCOUNTER — OFFICE VISIT (OUTPATIENT)
Dept: PRIMARY CARE CLINIC | Age: 28
End: 2023-11-13
Payer: COMMERCIAL

## 2023-11-13 VITALS
HEIGHT: 69 IN | TEMPERATURE: 98.1 F | WEIGHT: 180 LBS | SYSTOLIC BLOOD PRESSURE: 116 MMHG | OXYGEN SATURATION: 96 % | HEART RATE: 70 BPM | BODY MASS INDEX: 26.66 KG/M2 | DIASTOLIC BLOOD PRESSURE: 80 MMHG

## 2023-11-13 DIAGNOSIS — J40 BRONCHITIS: Primary | ICD-10-CM

## 2023-11-13 DIAGNOSIS — R06.2 WHEEZES: ICD-10-CM

## 2023-11-13 PROCEDURE — 99212 OFFICE O/P EST SF 10 MIN: CPT | Performed by: NURSE PRACTITIONER

## 2023-11-13 PROCEDURE — G8427 DOCREV CUR MEDS BY ELIG CLIN: HCPCS | Performed by: NURSE PRACTITIONER

## 2023-11-13 PROCEDURE — 4004F PT TOBACCO SCREEN RCVD TLK: CPT | Performed by: NURSE PRACTITIONER

## 2023-11-13 PROCEDURE — 99213 OFFICE O/P EST LOW 20 MIN: CPT | Performed by: NURSE PRACTITIONER

## 2023-11-13 PROCEDURE — G8484 FLU IMMUNIZE NO ADMIN: HCPCS | Performed by: NURSE PRACTITIONER

## 2023-11-13 PROCEDURE — G8417 CALC BMI ABV UP PARAM F/U: HCPCS | Performed by: NURSE PRACTITIONER

## 2023-11-13 RX ORDER — AZITHROMYCIN 250 MG/1
TABLET, FILM COATED ORAL
Qty: 1 PACKET | Refills: 0 | Status: SHIPPED | OUTPATIENT
Start: 2023-11-13

## 2023-11-13 RX ORDER — PREDNISONE 20 MG/1
20 TABLET ORAL 2 TIMES DAILY
Qty: 10 TABLET | Refills: 0 | Status: SHIPPED | OUTPATIENT
Start: 2023-11-13 | End: 2023-11-18

## 2023-11-13 ASSESSMENT — ENCOUNTER SYMPTOMS
SORE THROAT: 1
COUGH: 1
SHORTNESS OF BREATH: 0
CHEST TIGHTNESS: 0
WHEEZING: 1
RHINORRHEA: 0

## 2023-11-13 NOTE — PROGRESS NOTES
TEET[facility] OSORIO [facility]             [address] [facility], [address] [place], [place] [address]                        Telephone [phone]             Fax [phone]     Sarina Bermudez  1995  PID:[id number]   Date of visit:  11/13/2023    Subjective:    Sarina Bermudez is a 29 y.o.  female who presents to [facility] today (11/13/2023) for evaluation of:    Chief Complaint   Patient presents with    Chest Congestion     Sx started 2 weeks ago. Has chest congestion, cough, and bilat ears plugged       Cough  This is a new problem. The current episode started 1 to 4 weeks ago (X 2 weeks). The problem has been gradually worsening. The problem occurs every few minutes. The cough is Non-productive. Associated symptoms include ear congestion (bilateral), postnasal drip, a sore throat and wheezing. Pertinent negatives include no chest pain, ear pain, fever, headaches, nasal congestion, rash, rhinorrhea or shortness of breath. Nothing aggravates the symptoms. Treatments tried: albuterol inhaler and nebulizer, nyquil, sudafed. The treatment provided mild relief. Her past medical history is significant for asthma.        She has the following problem list:  Patient Active Problem List   Diagnosis    Panic disorder    PTSD (post-traumatic stress disorder)    Borderline personality disorder ([address])    Tobacco abuse    Cigarette smoker    Recurrent major depressive disorder, in full remission ([address])    Overweight    Mild intermittent asthma without complication    Endometriosis        Current medications are:  Current Outpatient Medications   Medication Sig Dispense Refill    azithromycin (ZITHROMAX Z-UMU) 250 MG tablet Take 2 tablets (500 mg) on Day 1, and then take 1 tablet (250 mg) on days 2 through 5. 1 packet 0    predniSONE (DELTASONE) 20 MG tablet Take 1 tablet by mouth 2 times daily for 5 days 10 tablet 0    albuterol sulfate HFA (VENTOLIN HFA) 108 (90 Base)

## 2023-11-15 ENCOUNTER — TELEPHONE (OUTPATIENT)
Dept: INTERNAL MEDICINE | Age: 28
End: 2023-11-15

## 2023-11-15 NOTE — TELEPHONE ENCOUNTER
Patient is using her nebulizer BID and her albuterol inhaler using every 2-3 hours. Patient states she was just seen in the Walk in clinic on the 13th,  and was diagnosed with bronchitis. Given prednisone and azithromycin. How often can she use the nebulizer and the inhaler? Her mom feels she is over using them but Jennifre states my chest feels so tight, have SOB that I need to use them that often. Please advise.

## 2023-12-04 DIAGNOSIS — F41.0 PANIC DISORDER: ICD-10-CM

## 2023-12-04 RX ORDER — ALPRAZOLAM 1 MG/1
TABLET ORAL
Qty: 90 TABLET | Refills: 0 | Status: SHIPPED | OUTPATIENT
Start: 2023-12-04 | End: 2024-01-04

## 2024-01-05 DIAGNOSIS — F41.0 PANIC DISORDER: ICD-10-CM

## 2024-01-05 RX ORDER — ALPRAZOLAM 1 MG/1
TABLET ORAL
Qty: 90 TABLET | Refills: 0 | Status: SHIPPED | OUTPATIENT
Start: 2024-01-05 | End: 2024-02-05

## 2024-01-05 NOTE — TELEPHONE ENCOUNTER
Pharmacy requesting a refill of the below medication which has been pended for you:     Requested Prescriptions     Pending Prescriptions Disp Refills    ALPRAZolam (XANAX) 1 MG tablet [Pharmacy Med Name: ALPRAZOLAM 1 MG TABLET] 90 tablet 0     Sig: take 1 tablet by mouth three times a day if needed       Last Appointment Date: 9/15/2023  Next Appointment Date: 3/15/2024    Allergies   Allergen Reactions    Codeine Anaphylaxis     Happened when in ER getting arm reset at age 7  States did have to be tubed    Tape [Adhesive Tape] Other (See Comments)     Paper tape causes blisters

## 2024-01-06 NOTE — TELEPHONE ENCOUNTER
OARRS checked today    Controlled Substance Monitoring:    Acute and Chronic Pain Monitoring:   RX Monitoring Periodic Controlled Substance Monitoring   1/5/2024   8:59 PM No signs of potential drug abuse or diversion identified.

## 2024-01-10 ENCOUNTER — OFFICE VISIT (OUTPATIENT)
Dept: PRIMARY CARE CLINIC | Age: 29
End: 2024-01-10
Payer: COMMERCIAL

## 2024-01-10 VITALS
OXYGEN SATURATION: 99 % | SYSTOLIC BLOOD PRESSURE: 104 MMHG | TEMPERATURE: 98.3 F | WEIGHT: 184 LBS | BODY MASS INDEX: 27.25 KG/M2 | HEIGHT: 69 IN | RESPIRATION RATE: 16 BRPM | DIASTOLIC BLOOD PRESSURE: 70 MMHG | HEART RATE: 79 BPM

## 2024-01-10 DIAGNOSIS — R05.1 ACUTE COUGH: ICD-10-CM

## 2024-01-10 DIAGNOSIS — J06.9 VIRAL UPPER RESPIRATORY TRACT INFECTION: Primary | ICD-10-CM

## 2024-01-10 PROCEDURE — G8417 CALC BMI ABV UP PARAM F/U: HCPCS | Performed by: NURSE PRACTITIONER

## 2024-01-10 PROCEDURE — G8427 DOCREV CUR MEDS BY ELIG CLIN: HCPCS | Performed by: NURSE PRACTITIONER

## 2024-01-10 PROCEDURE — 99213 OFFICE O/P EST LOW 20 MIN: CPT | Performed by: NURSE PRACTITIONER

## 2024-01-10 PROCEDURE — 99212 OFFICE O/P EST SF 10 MIN: CPT | Performed by: NURSE PRACTITIONER

## 2024-01-10 PROCEDURE — G8484 FLU IMMUNIZE NO ADMIN: HCPCS | Performed by: NURSE PRACTITIONER

## 2024-01-10 PROCEDURE — 4004F PT TOBACCO SCREEN RCVD TLK: CPT | Performed by: NURSE PRACTITIONER

## 2024-01-10 RX ORDER — BENZONATATE 100 MG/1
100 CAPSULE ORAL 3 TIMES DAILY PRN
Qty: 30 CAPSULE | Refills: 0 | Status: SHIPPED | OUTPATIENT
Start: 2024-01-10 | End: 2024-01-20

## 2024-01-10 RX ORDER — NORGESTIMATE AND ETHINYL ESTRADIOL 0.25-0.035
1 KIT ORAL EVERY MORNING
COMMUNITY

## 2024-01-10 ASSESSMENT — ENCOUNTER SYMPTOMS
RHINORRHEA: 0
COUGH: 1
SORE THROAT: 1
WHEEZING: 1
SHORTNESS OF BREATH: 0

## 2024-01-10 ASSESSMENT — PATIENT HEALTH QUESTIONNAIRE - PHQ9
SUM OF ALL RESPONSES TO PHQ QUESTIONS 1-9: 0
SUM OF ALL RESPONSES TO PHQ QUESTIONS 1-9: 0
2. FEELING DOWN, DEPRESSED OR HOPELESS: 0
SUM OF ALL RESPONSES TO PHQ9 QUESTIONS 1 & 2: 0
SUM OF ALL RESPONSES TO PHQ QUESTIONS 1-9: 0
SUM OF ALL RESPONSES TO PHQ QUESTIONS 1-9: 0
1. LITTLE INTEREST OR PLEASURE IN DOING THINGS: 0

## 2024-01-11 NOTE — PROGRESS NOTES
[facility]             [address] [address]                        Telephone [phone]             Fax [phone]     Jennifer Hudson  1995  MRN:[id number]   Date of visit:  1/10/2024    Subjective:    Jennifer Hudson is a 28 y.o.  female who presents to [facility] today (1/10/2024) for evaluation of:    Chief Complaint   Patient presents with    Cough     Cough, congestion, fever. Started 3 days ago. Using inhaler and nebulizer. Home covid test negative       Cough  This is a new problem. The current episode started in the past 7 days (01/07/24). The problem has been gradually worsening. The problem occurs every few minutes. The cough is Non-productive. Associated symptoms include a fever (100.2 last night), myalgias, nasal congestion, postnasal drip, a sore throat (began last night 4/10) and wheezing. Pertinent negatives include no chest pain, chills, ear congestion, ear pain, headaches, rash, rhinorrhea or shortness of breath. Nothing aggravates the symptoms. Treatments tried: nyquil, tylenol, albuterol nebulizer or inhaler. The treatment provided mild relief. Her past medical history is significant for asthma.       She has the following problem list:  Patient Active Problem List   Diagnosis    Panic disorder    PTSD (post-traumatic stress disorder)    Borderline personality disorder (HCC)    Tobacco abuse    Cigarette smoker    Recurrent major depressive disorder, in full remission (HCC)    Overweight    Mild intermittent asthma without complication    Endometriosis        Current medications are:  Current Outpatient Medications   Medication Sig Dispense Refill    norgestimate-ethinyl estradiol (ORTHO-CYCLEN) 0.25-35 MG-MCG per tablet Take 1 tablet by mouth every morning      benzonatate (TESSALON) 100 MG capsule Take 1 capsule by mouth 3 times daily as needed for Cough 30 capsule 0

## 2024-01-18 ENCOUNTER — APPOINTMENT (OUTPATIENT)
Dept: GENERAL RADIOLOGY | Age: 29
End: 2024-01-18
Payer: COMMERCIAL

## 2024-01-18 ENCOUNTER — TELEPHONE (OUTPATIENT)
Dept: INTERNAL MEDICINE | Age: 29
End: 2024-01-18

## 2024-01-18 ENCOUNTER — HOSPITAL ENCOUNTER (EMERGENCY)
Age: 29
Discharge: HOME | End: 2024-01-18
Attending: EMERGENCY MEDICINE
Payer: COMMERCIAL

## 2024-01-18 VITALS
OXYGEN SATURATION: 97 % | DIASTOLIC BLOOD PRESSURE: 82 MMHG | RESPIRATION RATE: 14 BRPM | TEMPERATURE: 97.7 F | HEIGHT: 69 IN | HEART RATE: 70 BPM | SYSTOLIC BLOOD PRESSURE: 126 MMHG | WEIGHT: 180 LBS | BODY MASS INDEX: 26.66 KG/M2

## 2024-01-18 DIAGNOSIS — R05.9 COUGH, UNSPECIFIED TYPE: ICD-10-CM

## 2024-01-18 DIAGNOSIS — R07.89 ATYPICAL CHEST PAIN: Primary | ICD-10-CM

## 2024-01-18 LAB
ALBUMIN SERPL-MCNC: 3.9 G/DL (ref 3.5–5.2)
ALBUMIN/GLOB SERPL: 1.4 {RATIO} (ref 1–2.5)
ALP SERPL-CCNC: 67 U/L (ref 35–104)
ALT SERPL-CCNC: 7 U/L (ref 5–33)
ANION GAP SERPL CALCULATED.3IONS-SCNC: 10 MMOL/L (ref 9–17)
AST SERPL-CCNC: 9 U/L
BASOPHILS # BLD: 0.04 K/UL (ref 0–0.2)
BASOPHILS NFR BLD: 1 % (ref 0–2)
BILIRUB SERPL-MCNC: 0.5 MG/DL (ref 0.3–1.2)
BUN SERPL-MCNC: 17 MG/DL (ref 6–20)
BUN/CREAT SERPL: 28 (ref 9–20)
CALCIUM SERPL-MCNC: 9 MG/DL (ref 8.6–10.4)
CHLORIDE SERPL-SCNC: 103 MMOL/L (ref 98–107)
CO2 SERPL-SCNC: 25 MMOL/L (ref 20–31)
CREAT SERPL-MCNC: 0.6 MG/DL (ref 0.5–0.9)
D DIMER PPP FEU-MCNC: 0.38 UG/ML FEU (ref 0–0.59)
EOSINOPHIL # BLD: 0.12 K/UL (ref 0–0.44)
EOSINOPHILS RELATIVE PERCENT: 2 % (ref 1–4)
ERYTHROCYTE [DISTWIDTH] IN BLOOD BY AUTOMATED COUNT: 11.9 % (ref 11.8–14.4)
GFR SERPL CREATININE-BSD FRML MDRD: >60 ML/MIN/1.73M2
GLUCOSE SERPL-MCNC: 96 MG/DL (ref 70–99)
HCG SERPL QL: NEGATIVE
HCT VFR BLD AUTO: 35.5 % (ref 36.3–47.1)
HGB BLD-MCNC: 11.9 G/DL (ref 11.9–15.1)
IMM GRANULOCYTES # BLD AUTO: 0.03 K/UL (ref 0–0.3)
IMM GRANULOCYTES NFR BLD: 0 %
LYMPHOCYTES NFR BLD: 1.74 K/UL (ref 1.1–3.7)
LYMPHOCYTES RELATIVE PERCENT: 22 % (ref 24–43)
MCH RBC QN AUTO: 31.2 PG (ref 25.2–33.5)
MCHC RBC AUTO-ENTMCNC: 33.5 G/DL (ref 25.2–33.5)
MCV RBC AUTO: 93.2 FL (ref 82.6–102.9)
MONOCYTES NFR BLD: 0.42 K/UL (ref 0.1–1.2)
MONOCYTES NFR BLD: 5 % (ref 3–12)
NEUTROPHILS NFR BLD: 70 % (ref 36–65)
NEUTS SEG NFR BLD: 5.69 K/UL (ref 1.5–8.1)
NRBC BLD-RTO: 0 PER 100 WBC
PLATELET # BLD AUTO: 290 K/UL (ref 138–453)
PMV BLD AUTO: 10.8 FL (ref 8.1–13.5)
POTASSIUM SERPL-SCNC: 4.1 MMOL/L (ref 3.7–5.3)
PROT SERPL-MCNC: 6.7 G/DL (ref 6.4–8.3)
RBC # BLD AUTO: 3.81 M/UL (ref 3.95–5.11)
SARS-COV-2 RDRP RESP QL NAA+PROBE: NOT DETECTED
SODIUM SERPL-SCNC: 138 MMOL/L (ref 135–144)
SPECIMEN DESCRIPTION: NORMAL
TROPONIN I SERPL HS-MCNC: <6 NG/L (ref 0–14)
WBC OTHER # BLD: 8 K/UL (ref 3.5–11.3)

## 2024-01-18 PROCEDURE — 84484 ASSAY OF TROPONIN QUANT: CPT

## 2024-01-18 PROCEDURE — 85379 FIBRIN DEGRADATION QUANT: CPT

## 2024-01-18 PROCEDURE — 93005 ELECTROCARDIOGRAM TRACING: CPT | Performed by: EMERGENCY MEDICINE

## 2024-01-18 PROCEDURE — 99285 EMERGENCY DEPT VISIT HI MDM: CPT

## 2024-01-18 PROCEDURE — 85025 COMPLETE CBC W/AUTO DIFF WBC: CPT

## 2024-01-18 PROCEDURE — 84703 CHORIONIC GONADOTROPIN ASSAY: CPT

## 2024-01-18 PROCEDURE — 80053 COMPREHEN METABOLIC PANEL: CPT

## 2024-01-18 PROCEDURE — 87635 SARS-COV-2 COVID-19 AMP PRB: CPT

## 2024-01-18 PROCEDURE — 71045 X-RAY EXAM CHEST 1 VIEW: CPT

## 2024-01-18 PROCEDURE — 36415 COLL VENOUS BLD VENIPUNCTURE: CPT

## 2024-01-18 ASSESSMENT — PAIN DESCRIPTION - ORIENTATION: ORIENTATION: LEFT

## 2024-01-18 ASSESSMENT — ENCOUNTER SYMPTOMS
ABDOMINAL PAIN: 0
DIARRHEA: 0
VOMITING: 0
COUGH: 1
SORE THROAT: 0
WHEEZING: 0
BACK PAIN: 0
NAUSEA: 0
SHORTNESS OF BREATH: 1

## 2024-01-18 ASSESSMENT — PAIN - FUNCTIONAL ASSESSMENT
PAIN_FUNCTIONAL_ASSESSMENT: 0-10
PAIN_FUNCTIONAL_ASSESSMENT: ACTIVITIES ARE NOT PREVENTED

## 2024-01-18 ASSESSMENT — PAIN DESCRIPTION - DESCRIPTORS: DESCRIPTORS: SHARP

## 2024-01-18 ASSESSMENT — PAIN SCALES - GENERAL: PAINLEVEL_OUTOF10: 4

## 2024-01-18 ASSESSMENT — PAIN DESCRIPTION - PAIN TYPE: TYPE: ACUTE PAIN

## 2024-01-18 ASSESSMENT — PAIN DESCRIPTION - LOCATION: LOCATION: CHEST

## 2024-01-18 ASSESSMENT — PAIN DESCRIPTION - FREQUENCY: FREQUENCY: INTERMITTENT

## 2024-01-18 NOTE — ED PROVIDER NOTES
[facility]  [address] [facility] [address]  Phone: [phone]    eMERGENCY dEPARTMENT eNCOUnter        Pt Name: Jennifer Hudson  MRN: [id number]  Birthdate 1995  Date of evaluation: 1/18/24    CHIEF COMPLAINT     Chief Complaint   Patient presents with    Chest Pain     Intermittent left sided chest pain \"sharp\" in nature since last night after having viral symptoms recently.       HISTORY OF PRESENT ILLNESS    Jennifer Hudson is a 28 y.o. female who presents to the emergency department with a stabbing sensation in her left rib cage.  She stated it has been intermittent now without any aggravating or alleviating factors.  Intermittent for 2 days.  No pattern.  She feels she is little short of breath with subjective fevers and chills.  She had cough and congestion for 2 weeks was seen and evaluated at the clinic and was told she had a viral syndrome.  She is not COVID vaccinated.  She had an asthma attack yesterday felt like her fingers turn blue today.  She had an ovarian cyst that was removed in October of last year was changed on birth control was concerned because of the new estrogen.  She has no history of PE or DVT.    REVIEW OF SYSTEMS     Review of Systems   Constitutional:  Negative for chills and fever.   HENT:  Negative for congestion, ear pain and sore throat.    Respiratory:  Positive for cough and shortness of breath. Negative for wheezing.    Cardiovascular:  Positive for chest pain. Negative for palpitations and leg swelling.   Gastrointestinal:  Negative for abdominal pain, diarrhea, nausea and vomiting.   Genitourinary:  Negative for dysuria, flank pain, frequency and hematuria.   Musculoskeletal:  Negative for back pain.   Skin:  Negative for rash.   Neurological:  Negative for dizziness, light-headedness, numbness and headaches.       PAST MEDICAL HISTORY    has a past medical history of Anxiety, Bipolar disease, chronic (HCC), Borderline personality disorder

## 2024-01-18 NOTE — TELEPHONE ENCOUNTER
Patient was just released from the ED and would like a fu call about her labs they charli and she is \"joseline confused\" about some that she saw on her mychart. Phone number confirmed with her with the one on file. Please advise. She asked specifically for Justine

## 2024-01-18 NOTE — TELEPHONE ENCOUNTER
Her creatinine level is 0.6, which is completely normal. If they're talking about the BUN/creatinine ratio, that number is meaningless since the BUN and Creatinine numbers themselves are perfectly normal. A high or low ratio is only valid if the BUN or creatinine numbers are out of range, as it can provide a clue as to why. The ratio is a calculated number and our system does calculations all the time, every time, regardless of whether it makes sense to do so or not.    The absolute red blood cell count is slightly low but that doesn't really have anything to do with infections. That is usually the white blood cell count that is high in those situations. But I'm not all that concerned about the red blood cell count being slightly low because her hemoglobin level is normal. We can recheck a blood count later to make sure it's not getting worse.    I will review the emergency room report again to determine what additional testing and/or treatment she needs.

## 2024-01-18 NOTE — TELEPHONE ENCOUNTER
I just reviewed the blood work and I do not see anything that is particularly concerning. If she could state which labs she has questions about I can give her some explanations.

## 2024-01-18 NOTE — DISCHARGE INSTRUCTIONS
Return to the emergency department if symptoms worsen or persist.  Follow-up with your family doctor 1 to 2 days.

## 2024-01-18 NOTE — TELEPHONE ENCOUNTER
Liliya called in concerning patient. Patient and liliya are very concerned stating that patients creatinine was through the roof and that her RBC was elevated as well. Per liliya they are concerned that patient has an infection.  Patient reports sx of cough,SOB, trouble breathing and is having nausea, vomiting and diarrhea.     I do see previous note from Dr. WHITING, since there was another call, please advise if you would like anything else. They specifically mentioned her creatinine and RBC and a concern for infection.     Saqib can be reached back at [phone]

## 2024-01-19 LAB
EKG ATRIAL RATE: 84 BPM
EKG P AXIS: 19 DEGREES
EKG P-R INTERVAL: 160 MS
EKG Q-T INTERVAL: 354 MS
EKG QRS DURATION: 94 MS
EKG QTC CALCULATION (BAZETT): 418 MS
EKG R AXIS: 76 DEGREES
EKG T AXIS: 62 DEGREES
EKG VENTRICULAR RATE: 84 BPM

## 2024-02-06 DIAGNOSIS — F41.0 PANIC DISORDER: ICD-10-CM

## 2024-02-06 RX ORDER — ALPRAZOLAM 1 MG/1
TABLET ORAL
Qty: 90 TABLET | Refills: 0 | Status: SHIPPED | OUTPATIENT
Start: 2024-02-06 | End: 2024-03-07

## 2024-03-05 DIAGNOSIS — F41.0 PANIC DISORDER: ICD-10-CM

## 2024-03-05 RX ORDER — ALPRAZOLAM 1 MG/1
TABLET ORAL
Qty: 90 TABLET | Refills: 0 | Status: SHIPPED | OUTPATIENT
Start: 2024-03-05 | End: 2024-04-04

## 2024-03-05 NOTE — TELEPHONE ENCOUNTER
OARRS checked today    Controlled Substance Monitoring:    Acute and Chronic Pain Monitoring:   RX Monitoring Periodic Controlled Substance Monitoring   3/5/2024   4:38 PM No signs of potential drug abuse or diversion identified.

## 2024-03-15 ENCOUNTER — TELEPHONE (OUTPATIENT)
Dept: INTERNAL MEDICINE | Age: 29
End: 2024-03-15

## 2024-03-15 RX ORDER — CLINDAMYCIN HYDROCHLORIDE 300 MG/1
300 CAPSULE ORAL 4 TIMES DAILY
Qty: 56 CAPSULE | Refills: 0 | Status: SHIPPED | OUTPATIENT
Start: 2024-03-15 | End: 2024-03-29

## 2024-03-15 NOTE — TELEPHONE ENCOUNTER
Stated having tooth pain and a spot that is draining. Worried about infection. Has an oral surgery scheduled 4/18 to have all teeth removed but can't get in sooner. Would like to know if antibiotic could be called in if so uses [facility]

## 2024-03-20 ENCOUNTER — OFFICE VISIT (OUTPATIENT)
Dept: INTERNAL MEDICINE | Age: 29
End: 2024-03-20
Payer: COMMERCIAL

## 2024-03-20 VITALS
HEART RATE: 66 BPM | SYSTOLIC BLOOD PRESSURE: 115 MMHG | RESPIRATION RATE: 18 BRPM | WEIGHT: 187 LBS | BODY MASS INDEX: 27.7 KG/M2 | DIASTOLIC BLOOD PRESSURE: 70 MMHG | HEIGHT: 69 IN

## 2024-03-20 DIAGNOSIS — Z72.0 TOBACCO USE: ICD-10-CM

## 2024-03-20 DIAGNOSIS — F43.10 PTSD (POST-TRAUMATIC STRESS DISORDER): Primary | ICD-10-CM

## 2024-03-20 DIAGNOSIS — F33.40 RECURRENT MAJOR DEPRESSIVE DISORDER, IN REMISSION (HCC): ICD-10-CM

## 2024-03-20 DIAGNOSIS — J45.20 MILD INTERMITTENT ASTHMA WITHOUT COMPLICATION: ICD-10-CM

## 2024-03-20 DIAGNOSIS — F60.3 BORDERLINE PERSONALITY DISORDER (HCC): ICD-10-CM

## 2024-03-20 DIAGNOSIS — F17.210 CIGARETTE NICOTINE DEPENDENCE WITHOUT COMPLICATION: ICD-10-CM

## 2024-03-20 DIAGNOSIS — N80.9 ENDOMETRIOSIS: ICD-10-CM

## 2024-03-20 DIAGNOSIS — F41.0 PANIC DISORDER: ICD-10-CM

## 2024-03-20 PROCEDURE — 99212 OFFICE O/P EST SF 10 MIN: CPT | Performed by: INTERNAL MEDICINE

## 2024-03-20 PROCEDURE — 99406 BEHAV CHNG SMOKING 3-10 MIN: CPT | Performed by: INTERNAL MEDICINE

## 2024-03-20 PROCEDURE — 99214 OFFICE O/P EST MOD 30 MIN: CPT | Performed by: INTERNAL MEDICINE

## 2024-03-20 PROCEDURE — G8427 DOCREV CUR MEDS BY ELIG CLIN: HCPCS | Performed by: INTERNAL MEDICINE

## 2024-03-20 PROCEDURE — G8417 CALC BMI ABV UP PARAM F/U: HCPCS | Performed by: INTERNAL MEDICINE

## 2024-03-20 PROCEDURE — 4004F PT TOBACCO SCREEN RCVD TLK: CPT | Performed by: INTERNAL MEDICINE

## 2024-03-20 PROCEDURE — G8484 FLU IMMUNIZE NO ADMIN: HCPCS | Performed by: INTERNAL MEDICINE

## 2024-03-20 SDOH — ECONOMIC STABILITY: FOOD INSECURITY: WITHIN THE PAST 12 MONTHS, YOU WORRIED THAT YOUR FOOD WOULD RUN OUT BEFORE YOU GOT MONEY TO BUY MORE.: NEVER TRUE

## 2024-03-20 SDOH — ECONOMIC STABILITY: FOOD INSECURITY: WITHIN THE PAST 12 MONTHS, THE FOOD YOU BOUGHT JUST DIDN'T LAST AND YOU DIDN'T HAVE MONEY TO GET MORE.: NEVER TRUE

## 2024-03-20 SDOH — ECONOMIC STABILITY: INCOME INSECURITY: HOW HARD IS IT FOR YOU TO PAY FOR THE VERY BASICS LIKE FOOD, HOUSING, MEDICAL CARE, AND HEATING?: NOT HARD AT ALL

## 2024-03-20 NOTE — PROGRESS NOTES
(VENTOLIN HFA) 108 (90 Base) MCG/ACT inhaler inhale 1-2 puffs by mouth and INTO THE LUNGS every 6 hours if needed 18 g 11    tiZANidine (ZANAFLEX) 4 MG tablet take 1 tablet by mouth every 8 hours if needed for muscle spasm 90 tablet 5    busPIRone (BUSPAR) 15 MG tablet take 1 tablet by mouth three times a day 90 tablet 5    naproxen (NAPROSYN) 500 MG tablet take 1 tablet by mouth twice a day with meals 30 tablet 11    ferrous sulfate (IRON 325) 325 (65 Fe) MG tablet Take 1 tablet by mouth every other day Patient reports she just started this herself and only taking one tablet every other day.      albuterol (PROVENTIL) (2.5 MG/3ML) 0.083% nebulizer solution Take 3 mLs by nebulization every 6 hours as needed for Wheezing or Shortness of Breath 120 each 3       IMMUNIZATIONS: Reviewed for influenza and pneumococcal status as indicated in electronic record.    REVIEW OF SYSTEMS:     General: negative for - chills, fever, or night sweats  Skin: negative for - pruritus or rash  Head: Negative for: headache or recent history of head trauma  Ear, Nose, Throat: negative for - epistaxis, nasal congestion, nasal discharge, sore throat, tinnitus, or vertigo  Cardiovascular: negative for - chest pain, dyspnea on exertion, orthopnea, palpitations, paroxysmal nocturnal dyspnea, or shortness of breath  Respiratory: negative for - cough, hemoptysis, or wheezing  Gastrointestinal: negative for - constipation, diarrhea, or nausea/vomiting  Genitourinary: negative for - dysuria, hematuria, or nocturia  Musculoskeletal: negative for - joint pain, muscle pain, or muscular weakness  Neurologic: negative for - gait disturbance, numbness/tingling, seizures, or tremors  Psychiatric: positive for - anxiety and post-traumatic stress disorder   negative for - suicidal ideation       PHYSICAL EXAMINATION:    Wt Readings from Last 2 Encounters:   03/20/24 84.8 kg (187 lb)   01/18/24 81.6 kg (180 lb)       Vitals:    03/20/24 1605   BP: 115/70

## 2024-03-25 ENCOUNTER — HOSPITAL ENCOUNTER (OUTPATIENT)
Age: 29
Discharge: HOME | End: 2024-03-25

## 2024-03-26 ENCOUNTER — HOSPITAL ENCOUNTER (OUTPATIENT)
Age: 29
Setting detail: SPECIMEN
Discharge: HOME | End: 2024-03-26
Payer: COMMERCIAL

## 2024-03-26 ENCOUNTER — OFFICE VISIT (OUTPATIENT)
Dept: PRIMARY CARE CLINIC | Age: 29
End: 2024-03-26
Payer: COMMERCIAL

## 2024-03-26 VITALS
TEMPERATURE: 98.9 F | SYSTOLIC BLOOD PRESSURE: 118 MMHG | BODY MASS INDEX: 28.31 KG/M2 | HEART RATE: 101 BPM | OXYGEN SATURATION: 99 % | DIASTOLIC BLOOD PRESSURE: 78 MMHG | HEIGHT: 69 IN | WEIGHT: 191.13 LBS | RESPIRATION RATE: 15 BRPM

## 2024-03-26 DIAGNOSIS — R21 RASH: Primary | ICD-10-CM

## 2024-03-26 DIAGNOSIS — R21 RASH: ICD-10-CM

## 2024-03-26 PROCEDURE — 4004F PT TOBACCO SCREEN RCVD TLK: CPT | Performed by: FAMILY MEDICINE

## 2024-03-26 PROCEDURE — 87798 DETECT AGENT NOS DNA AMP: CPT

## 2024-03-26 PROCEDURE — 99214 OFFICE O/P EST MOD 30 MIN: CPT | Performed by: FAMILY MEDICINE

## 2024-03-26 PROCEDURE — G8427 DOCREV CUR MEDS BY ELIG CLIN: HCPCS | Performed by: FAMILY MEDICINE

## 2024-03-26 PROCEDURE — 99212 OFFICE O/P EST SF 10 MIN: CPT | Performed by: FAMILY MEDICINE

## 2024-03-26 PROCEDURE — G8484 FLU IMMUNIZE NO ADMIN: HCPCS | Performed by: FAMILY MEDICINE

## 2024-03-26 PROCEDURE — 87529 HSV DNA AMP PROBE: CPT

## 2024-03-26 PROCEDURE — G8417 CALC BMI ABV UP PARAM F/U: HCPCS | Performed by: FAMILY MEDICINE

## 2024-03-26 RX ORDER — LIDOCAINE 50 MG/G
OINTMENT TOPICAL
Qty: 50 G | Refills: 0 | Status: SHIPPED | OUTPATIENT
Start: 2024-03-26

## 2024-03-26 RX ORDER — PREDNISONE 20 MG/1
TABLET ORAL
Qty: 10 TABLET | Refills: 0 | Status: SHIPPED | OUTPATIENT
Start: 2024-03-26

## 2024-03-26 RX ORDER — VALACYCLOVIR HYDROCHLORIDE 1 G/1
1000 TABLET, FILM COATED ORAL 3 TIMES DAILY
Qty: 21 TABLET | Refills: 0 | Status: SHIPPED | OUTPATIENT
Start: 2024-03-26

## 2024-03-26 ASSESSMENT — ENCOUNTER SYMPTOMS
SHORTNESS OF BREATH: 0
SORE THROAT: 0
COLOR CHANGE: 0

## 2024-03-26 NOTE — PROGRESS NOTES
herpes zoster due to having 3 separate patches in different linear patterns on the body, but does have burning and pain with the lesions, so possibly some other type of herpetic flare?  Will treat with valtrex as noted and did give some prednisone as to me they appear more reactive.  Should take the valtrex until the culture results are available.  Did give some lidocaine ointment as she states that at times the pain is pretty severe.    Tylenol/Motrin prn for pain if needed.    Return  if no improvement in symptoms or if any further symptoms arise.    No follow-ups on file.    An electronic signature was used to authenticate this note.    --Celia Geronimo,  on 3/26/2024 at 4:02 PM

## 2024-03-27 DIAGNOSIS — R21 RASH: Primary | ICD-10-CM

## 2024-03-27 DIAGNOSIS — R21 RASH: ICD-10-CM

## 2024-03-27 LAB
MICROORGANISM SPEC CULT: NORMAL
SPECIMEN DESCRIPTION: NORMAL

## 2024-03-27 NOTE — PROGRESS NOTES
Lab returned call and need order placed for LAB 5681. Order placed under the direction of Dr Geronimo.

## 2024-03-29 LAB
SPECIMEN DESCRIPTION: NORMAL
VZV DNA SPEC QL NAA+PROBE: NEGATIVE
VZV DNA SPEC QL NAA+PROBE: NORMAL

## 2024-04-03 DIAGNOSIS — F41.0 PANIC DISORDER: ICD-10-CM

## 2024-04-04 RX ORDER — ALPRAZOLAM 1 MG/1
TABLET ORAL
Qty: 90 TABLET | Refills: 0 | Status: SHIPPED | OUTPATIENT
Start: 2024-04-04 | End: 2024-05-04

## 2024-04-19 ENCOUNTER — APPOINTMENT (OUTPATIENT)
Dept: ULTRASOUND IMAGING | Age: 29
End: 2024-04-19
Payer: COMMERCIAL

## 2024-04-19 ENCOUNTER — HOSPITAL ENCOUNTER (EMERGENCY)
Age: 29
Discharge: HOME | End: 2024-04-20
Attending: EMERGENCY MEDICINE
Payer: COMMERCIAL

## 2024-04-19 DIAGNOSIS — R10.31 RIGHT LOWER QUADRANT ABDOMINAL PAIN: Primary | ICD-10-CM

## 2024-04-19 DIAGNOSIS — R10.31 RIGHT LOWER QUADRANT PAIN: ICD-10-CM

## 2024-04-19 DIAGNOSIS — N83.201 CYSTS OF BOTH OVARIES: ICD-10-CM

## 2024-04-19 DIAGNOSIS — A59.9 TRICHOMONAS INFECTION: ICD-10-CM

## 2024-04-19 DIAGNOSIS — N39.0 URINARY TRACT INFECTION WITHOUT HEMATURIA, SITE UNSPECIFIED: ICD-10-CM

## 2024-04-19 DIAGNOSIS — N83.202 CYSTS OF BOTH OVARIES: ICD-10-CM

## 2024-04-19 DIAGNOSIS — Z3A.01 LESS THAN 8 WEEKS GESTATION OF PREGNANCY: ICD-10-CM

## 2024-04-19 LAB
ALBUMIN SERPL-MCNC: 4.1 G/DL (ref 3.5–5.2)
ALBUMIN/GLOB SERPL: 1.6 {RATIO} (ref 1–2.5)
ALP SERPL-CCNC: 50 U/L (ref 35–104)
ALT SERPL-CCNC: 13 U/L (ref 5–33)
ANION GAP SERPL CALCULATED.3IONS-SCNC: 11 MMOL/L (ref 9–17)
AST SERPL-CCNC: 14 U/L
B-HCG SERPL EIA 3RD IS-ACNC: 246.1 MIU/ML
BACTERIA URNS QL MICRO: ABNORMAL
BASOPHILS # BLD: 0.04 K/UL (ref 0–0.2)
BASOPHILS NFR BLD: 1 % (ref 0–2)
BILIRUB SERPL-MCNC: 0.5 MG/DL (ref 0.3–1.2)
BILIRUB UR QL STRIP: NEGATIVE
BUN SERPL-MCNC: 11 MG/DL (ref 6–20)
BUN/CREAT SERPL: 22 (ref 9–20)
C TRACH DNA SPEC QL PROBE+SIG AMP: NORMAL
CALCIUM SERPL-MCNC: 9.1 MG/DL (ref 8.6–10.4)
CANDIDA SPECIES: NEGATIVE
CHARACTER UR: ABNORMAL
CHLORIDE SERPL-SCNC: 103 MMOL/L (ref 98–107)
CLARITY UR: CLEAR
CO2 SERPL-SCNC: 25 MMOL/L (ref 20–31)
COLOR UR: YELLOW
CREAT SERPL-MCNC: 0.5 MG/DL (ref 0.5–0.9)
EOSINOPHIL # BLD: 0.11 K/UL (ref 0–0.44)
EOSINOPHILS RELATIVE PERCENT: 2 % (ref 1–4)
EPI CELLS #/AREA URNS HPF: ABNORMAL /HPF (ref 0–5)
ERYTHROCYTE [DISTWIDTH] IN BLOOD BY AUTOMATED COUNT: 12.3 % (ref 11.8–14.4)
GARDNERELLA VAGINALIS: NEGATIVE
GFR SERPL CREATININE-BSD FRML MDRD: >90 ML/MIN/1.73M2
GLUCOSE SERPL-MCNC: 115 MG/DL (ref 70–99)
GLUCOSE UR STRIP-MCNC: NEGATIVE MG/DL
HCG SERPL QL: POSITIVE
HCT VFR BLD AUTO: 34.3 % (ref 36.3–47.1)
HGB BLD-MCNC: 11.9 G/DL (ref 11.9–15.1)
HGB UR QL STRIP.AUTO: NEGATIVE
IMM GRANULOCYTES # BLD AUTO: <0.03 K/UL (ref 0–0.3)
IMM GRANULOCYTES NFR BLD: 0 %
KETONES UR STRIP-MCNC: NEGATIVE MG/DL
LEUKOCYTE ESTERASE UR QL STRIP: ABNORMAL
LIPASE SERPL-CCNC: 16 U/L (ref 13–60)
LYMPHOCYTES NFR BLD: 2.02 K/UL (ref 1.1–3.7)
LYMPHOCYTES RELATIVE PERCENT: 30 % (ref 24–43)
MCH RBC QN AUTO: 31.6 PG (ref 25.2–33.5)
MCHC RBC AUTO-ENTMCNC: 34.7 G/DL (ref 25.2–33.5)
MCV RBC AUTO: 91.2 FL (ref 82.6–102.9)
MONOCYTES NFR BLD: 0.49 K/UL (ref 0.1–1.2)
MONOCYTES NFR BLD: 7 % (ref 3–12)
N GONORRHOEA DNA SPEC QL PROBE+SIG AMP: NORMAL
NEUTROPHILS NFR BLD: 60 % (ref 36–65)
NEUTS SEG NFR BLD: 4.04 K/UL (ref 1.5–8.1)
NITRITE UR QL STRIP: NEGATIVE
NRBC BLD-RTO: 0 PER 100 WBC
PH UR STRIP: 7 [PH] (ref 5–6)
PLATELET # BLD AUTO: 260 K/UL (ref 138–453)
PMV BLD AUTO: 10.3 FL (ref 8.1–13.5)
POTASSIUM SERPL-SCNC: 3.7 MMOL/L (ref 3.7–5.3)
PROT SERPL-MCNC: 6.7 G/DL (ref 6.4–8.3)
PROT UR STRIP-MCNC: NEGATIVE MG/DL
RBC # BLD AUTO: 3.76 M/UL (ref 3.95–5.11)
RBC #/AREA URNS HPF: ABNORMAL /HPF (ref 0–4)
SODIUM SERPL-SCNC: 139 MMOL/L (ref 135–144)
SOURCE: ABNORMAL
SP GR UR STRIP: 1.01 (ref 1.01–1.02)
SPECIMEN DESCRIPTION: NORMAL
TRICHOMONAS: POSITIVE
UROBILINOGEN UR STRIP-ACNC: NORMAL EU/DL (ref 0–1)
WBC #/AREA URNS HPF: ABNORMAL /HPF (ref 0–4)
WBC OTHER # BLD: 6.7 K/UL (ref 3.5–11.3)

## 2024-04-19 PROCEDURE — 36415 COLL VENOUS BLD VENIPUNCTURE: CPT

## 2024-04-19 PROCEDURE — 87591 N.GONORRHOEAE DNA AMP PROB: CPT

## 2024-04-19 PROCEDURE — 85025 COMPLETE CBC W/AUTO DIFF WBC: CPT

## 2024-04-19 PROCEDURE — 99284 EMERGENCY DEPT VISIT MOD MDM: CPT

## 2024-04-19 PROCEDURE — 87510 GARDNER VAG DNA DIR PROBE: CPT

## 2024-04-19 PROCEDURE — 96374 THER/PROPH/DIAG INJ IV PUSH: CPT

## 2024-04-19 PROCEDURE — 81001 URINALYSIS AUTO W/SCOPE: CPT

## 2024-04-19 PROCEDURE — 84702 CHORIONIC GONADOTROPIN TEST: CPT

## 2024-04-19 PROCEDURE — 96375 TX/PRO/DX INJ NEW DRUG ADDON: CPT

## 2024-04-19 PROCEDURE — 6360000002 HC RX W HCPCS: Performed by: EMERGENCY MEDICINE

## 2024-04-19 PROCEDURE — 87660 TRICHOMONAS VAGIN DIR PROBE: CPT

## 2024-04-19 PROCEDURE — 93976 VASCULAR STUDY: CPT

## 2024-04-19 PROCEDURE — 2580000003 HC RX 258: Performed by: EMERGENCY MEDICINE

## 2024-04-19 PROCEDURE — 83690 ASSAY OF LIPASE: CPT

## 2024-04-19 PROCEDURE — 87491 CHLMYD TRACH DNA AMP PROBE: CPT

## 2024-04-19 PROCEDURE — 84703 CHORIONIC GONADOTROPIN ASSAY: CPT

## 2024-04-19 PROCEDURE — 93975 VASCULAR STUDY: CPT

## 2024-04-19 PROCEDURE — 76801 OB US < 14 WKS SINGLE FETUS: CPT

## 2024-04-19 PROCEDURE — 80053 COMPREHEN METABOLIC PANEL: CPT

## 2024-04-19 PROCEDURE — 76817 TRANSVAGINAL US OBSTETRIC: CPT

## 2024-04-19 PROCEDURE — 87480 CANDIDA DNA DIR PROBE: CPT

## 2024-04-19 RX ORDER — CEPHALEXIN 250 MG/1
500 CAPSULE ORAL ONCE
Status: COMPLETED | OUTPATIENT
Start: 2024-04-19 | End: 2024-04-20

## 2024-04-19 RX ORDER — ONDANSETRON 2 MG/ML
4 INJECTION INTRAMUSCULAR; INTRAVENOUS ONCE
Status: COMPLETED | OUTPATIENT
Start: 2024-04-19 | End: 2024-04-19

## 2024-04-19 RX ORDER — CEPHALEXIN 500 MG/1
500 CAPSULE ORAL 2 TIMES DAILY
Qty: 14 CAPSULE | Refills: 0 | Status: SHIPPED | OUTPATIENT
Start: 2024-04-19 | End: 2024-04-26

## 2024-04-19 RX ORDER — KETOROLAC TROMETHAMINE 30 MG/ML
30 INJECTION, SOLUTION INTRAMUSCULAR; INTRAVENOUS ONCE
Status: COMPLETED | OUTPATIENT
Start: 2024-04-19 | End: 2024-04-19

## 2024-04-19 RX ORDER — METRONIDAZOLE 250 MG/1
2000 TABLET ORAL ONCE
Status: COMPLETED | OUTPATIENT
Start: 2024-04-19 | End: 2024-04-20

## 2024-04-19 RX ORDER — 0.9 % SODIUM CHLORIDE 0.9 %
1000 INTRAVENOUS SOLUTION INTRAVENOUS ONCE
Status: COMPLETED | OUTPATIENT
Start: 2024-04-19 | End: 2024-04-19

## 2024-04-19 RX ADMIN — ONDANSETRON 4 MG: 2 INJECTION INTRAMUSCULAR; INTRAVENOUS at 20:35

## 2024-04-19 RX ADMIN — SODIUM CHLORIDE 1000 ML: 9 INJECTION, SOLUTION INTRAVENOUS at 20:35

## 2024-04-19 RX ADMIN — KETOROLAC TROMETHAMINE 30 MG: 30 INJECTION, SOLUTION INTRAMUSCULAR; INTRAVENOUS at 20:35

## 2024-04-19 ASSESSMENT — PAIN - FUNCTIONAL ASSESSMENT: PAIN_FUNCTIONAL_ASSESSMENT: 0-10

## 2024-04-19 ASSESSMENT — PAIN SCALES - GENERAL: PAINLEVEL_OUTOF10: 6

## 2024-04-20 VITALS
BODY MASS INDEX: 28.06 KG/M2 | HEART RATE: 86 BPM | RESPIRATION RATE: 16 BRPM | SYSTOLIC BLOOD PRESSURE: 137 MMHG | OXYGEN SATURATION: 98 % | DIASTOLIC BLOOD PRESSURE: 96 MMHG | TEMPERATURE: 98.5 F | WEIGHT: 190 LBS

## 2024-04-20 PROCEDURE — 6370000000 HC RX 637 (ALT 250 FOR IP): Performed by: EMERGENCY MEDICINE

## 2024-04-20 RX ADMIN — METRONIDAZOLE 2000 MG: 250 TABLET ORAL at 00:00

## 2024-04-20 RX ADMIN — CEPHALEXIN 500 MG: 250 CAPSULE ORAL at 00:01

## 2024-04-20 NOTE — ED PROVIDER NOTES
[facility]  [address] [facility] [address]  Phone: [phone]      Pt Name: Jennifer Hudson  MRN:[id number]  Birthdate 1995  Date of evaluation: 4/19/2024      CHIEF COMPLAINT       Chief Complaint   Patient presents with    Abdominal Pain     Hx ovarian cyst, pain x2 weeks, getting worse       HISTORY OF PRESENT ILLNESS   Jennifer Hudson is a 28 y.o. female who presents for evaluation of right lower quadrant abdominal pain.  The patient has history of an ectopic pregnancy, ovarian cyst, tubo-ovarian cyst resections, partial ovary resection, and IUD removal after migration.  She states that her last menstrual period was on 3/21/2024.  The patient reports that starting 2 weeks ago she developed a gradual onset, constant, waxing waning, progressive, sharp, stabbing, nonradiating, right lower quadrant abdominal pain and nausea.  She states that it feels like she has another ovarian cyst.  The patient still has her appendix but has had a cholecystectomy.  She states that she had not been taking any medications for her symptoms until today.  She had dental surgery this morning and took a Norco in the morning and then Norco in the afternoon.  She has had some improvement in her dental pain but no improvement in her abdominal pain.  The patient is on amoxicillin postop.  The patient does not list any other provoking or palliating factors.  Her OB/GYN is Dr. Winkler but she has not called him.  The patient last had a bowel movement yesterday and it was normal.  She denies fever, chills, headache, vision changes, neck pain, back pain, chest pain, shortness of breath, urinary/bowel symptoms, vaginal bleeding, vaginal discharge, focal weakness, numbness, tingling, recent injury or illness.    REVIEW OF SYSTEMS     Positive: Right lower quadrant abdominal pain, nausea  Ten point review of systems was reviewed and is negative unless otherwise noted in the HPI    PAST MEDICAL HISTORY    has a past

## 2024-04-21 ENCOUNTER — HOSPITAL ENCOUNTER (OUTPATIENT)
Age: 29
Setting detail: SPECIMEN
Discharge: HOME | End: 2024-04-21
Payer: COMMERCIAL

## 2024-04-21 DIAGNOSIS — Z3A.01 LESS THAN 8 WEEKS GESTATION OF PREGNANCY: ICD-10-CM

## 2024-04-21 LAB — B-HCG SERPL EIA 3RD IS-ACNC: 623.5 MIU/ML

## 2024-04-21 PROCEDURE — 36415 COLL VENOUS BLD VENIPUNCTURE: CPT

## 2024-04-21 PROCEDURE — 84702 CHORIONIC GONADOTROPIN TEST: CPT

## 2024-04-22 ENCOUNTER — TELEPHONE (OUTPATIENT)
Dept: INTERNAL MEDICINE | Age: 29
End: 2024-04-22

## 2024-04-22 LAB
C TRACH DNA SPEC QL PROBE+SIG AMP: NEGATIVE
N GONORRHOEA DNA SPEC QL PROBE+SIG AMP: NEGATIVE
SPECIMEN DESCRIPTION: NORMAL

## 2024-04-22 NOTE — TELEPHONE ENCOUNTER
Patient called in stating she was in the ER on the 19th and is looking for a f/u appointment. Patient wondering if it is necessary. I encouraged follow up to bridge any gaps that may still exist after ER. Patient verbalized understanding. Patient stated she is also going to f/u with OB. Appointment scheduled 4/24 at 10am.  Is this okay?

## 2024-04-26 ENCOUNTER — OFFICE VISIT (OUTPATIENT)
Dept: INTERNAL MEDICINE | Age: 29
End: 2024-04-26
Payer: COMMERCIAL

## 2024-04-26 VITALS
SYSTOLIC BLOOD PRESSURE: 130 MMHG | WEIGHT: 189 LBS | OXYGEN SATURATION: 98 % | BODY MASS INDEX: 27.99 KG/M2 | HEIGHT: 69 IN | HEART RATE: 80 BPM | RESPIRATION RATE: 16 BRPM | DIASTOLIC BLOOD PRESSURE: 78 MMHG

## 2024-04-26 DIAGNOSIS — R10.31 RIGHT LOWER QUADRANT ABDOMINAL PAIN: Primary | ICD-10-CM

## 2024-04-26 DIAGNOSIS — Z3A.01 LESS THAN 8 WEEKS GESTATION OF PREGNANCY: ICD-10-CM

## 2024-04-26 DIAGNOSIS — F41.0 PANIC DISORDER: ICD-10-CM

## 2024-04-26 DIAGNOSIS — N30.00 ACUTE CYSTITIS WITHOUT HEMATURIA: ICD-10-CM

## 2024-04-26 DIAGNOSIS — A59.01 TRICHOMONIASIS OF VAGINA: ICD-10-CM

## 2024-04-26 PROCEDURE — 4004F PT TOBACCO SCREEN RCVD TLK: CPT | Performed by: INTERNAL MEDICINE

## 2024-04-26 PROCEDURE — 99212 OFFICE O/P EST SF 10 MIN: CPT | Performed by: INTERNAL MEDICINE

## 2024-04-26 PROCEDURE — G8427 DOCREV CUR MEDS BY ELIG CLIN: HCPCS | Performed by: INTERNAL MEDICINE

## 2024-04-26 PROCEDURE — 99214 OFFICE O/P EST MOD 30 MIN: CPT | Performed by: INTERNAL MEDICINE

## 2024-04-26 PROCEDURE — G8417 CALC BMI ABV UP PARAM F/U: HCPCS | Performed by: INTERNAL MEDICINE

## 2024-04-26 NOTE — PROGRESS NOTES
DR. YUAN - PROGRESS NOTE    CHIEF COMPLAINT/HISTORY OF CHIEF COMPLAINT: This 28 y.o.  female comes in today as a follow up visit after having been seen in the emergency room at [facility] on 4/19/24 for abdominal pain. She was evaluated and found to be pregnant. She was also found to have a trichomonas infection and was treated for it with Flagyl. She was given Keflex for a UTI and she is almost done with that. She has been holding most of her medications. She has an upcoming appointment with her ob/gyn doctor, Dr. Winkler at [facility] on 5/13. She wants to know which of her medications will be safe to take during pregnancy. There are no other complaints.      ALLERGIES/INTOLERANCES:   Allergies   Allergen Reactions    Codeine Anaphylaxis     Happened when in ER getting arm reset at age 7  States did have to be tubed    Tape [Adhesive Tape] Other (See Comments)     Paper tape causes blisters       MEDICATIONS:   Outpatient Medications Marked as Taking for the 4/26/24 encounter (Office Visit) with Wilmar Yuan, DO   Medication Sig Dispense Refill    cephALEXin (KEFLEX) 500 MG capsule Take 1 capsule by mouth 2 times daily for 7 days 14 capsule 0    albuterol sulfate HFA (VENTOLIN HFA) 108 (90 Base) MCG/ACT inhaler inhale 1-2 puffs by mouth and INTO THE LUNGS every 6 hours if needed 18 g 11    ferrous sulfate (IRON 325) 325 (65 Fe) MG tablet Take 1 tablet by mouth every other day Patient reports she just started this herself and only taking one tablet every other day.      albuterol (PROVENTIL) (2.5 MG/3ML) 0.083% nebulizer solution Take 3 mLs by nebulization every 6 hours as needed for Wheezing or Shortness of Breath 120 each 3       IMMUNIZATIONS: Reviewed for influenza and pneumococcal status as indicated in electronic record.    REVIEW OF SYSTEMS:     Please see history of chief complaint above; otherwise no new problems with respect to

## 2024-05-11 ENCOUNTER — HOSPITAL ENCOUNTER (OUTPATIENT)
Age: 29
Discharge: HOME | End: 2024-05-11
Payer: COMMERCIAL

## 2024-05-11 DIAGNOSIS — Z81.8 FAMILY HISTORY OF AUTISM: ICD-10-CM

## 2024-05-11 DIAGNOSIS — Z82.79 FAMILY HISTORY OF CONGENITAL OR GENETIC CONDITION: ICD-10-CM

## 2024-05-11 LAB — SEND OUT REPORT: NORMAL

## 2024-05-11 PROCEDURE — 36415 COLL VENOUS BLD VENIPUNCTURE: CPT

## 2024-06-20 ENCOUNTER — TELEMEDICINE (OUTPATIENT)
Dept: INTERNAL MEDICINE | Age: 29
End: 2024-06-20
Payer: COMMERCIAL

## 2024-06-20 DIAGNOSIS — J45.20 MILD INTERMITTENT ASTHMA WITHOUT COMPLICATION: ICD-10-CM

## 2024-06-20 DIAGNOSIS — Z72.0 TOBACCO USE: ICD-10-CM

## 2024-06-20 DIAGNOSIS — E66.3 OVERWEIGHT: ICD-10-CM

## 2024-06-20 DIAGNOSIS — Z3A.14 14 WEEKS GESTATION OF PREGNANCY: ICD-10-CM

## 2024-06-20 DIAGNOSIS — F17.210 CIGARETTE NICOTINE DEPENDENCE WITHOUT COMPLICATION: ICD-10-CM

## 2024-06-20 DIAGNOSIS — F41.0 PANIC DISORDER: ICD-10-CM

## 2024-06-20 DIAGNOSIS — F43.10 PTSD (POST-TRAUMATIC STRESS DISORDER): ICD-10-CM

## 2024-06-20 DIAGNOSIS — F60.3 BORDERLINE PERSONALITY DISORDER (HCC): ICD-10-CM

## 2024-06-20 DIAGNOSIS — F33.40 RECURRENT MAJOR DEPRESSIVE DISORDER, IN REMISSION (HCC): Primary | ICD-10-CM

## 2024-06-20 PROCEDURE — 99214 OFFICE O/P EST MOD 30 MIN: CPT | Performed by: INTERNAL MEDICINE

## 2024-06-20 PROCEDURE — 99211 OFF/OP EST MAY X REQ PHY/QHP: CPT | Performed by: INTERNAL MEDICINE

## 2024-06-20 PROCEDURE — G8427 DOCREV CUR MEDS BY ELIG CLIN: HCPCS | Performed by: INTERNAL MEDICINE

## 2024-06-20 RX ORDER — ASPIRIN 81 MG/1
81 TABLET ORAL DAILY
COMMUNITY

## 2024-06-20 RX ORDER — ASCORBIC ACID 500 MG
500 TABLET ORAL DAILY
COMMUNITY

## 2024-06-20 NOTE — PROGRESS NOTES
DR. YUAN - TELEHEALTH PROGRESS NOTE    CHIEF COMPLAINT/HISTORY OF CHIEF COMPLAINT: This 28 y.o.  female presents via TeleHealth visit today for ongoing evaluation and management of her asthma, post-traumatic stress disorder, and anxiety. She recently found out that she is pregnant again. She has been following with obstetrics at [facility]. They have her off all of her psychiatric medications. Her Buspar and Xanax are on hold, and her post-traumatic stress disorder and anxiety have been \"stable.\" She has been coping \"as best as she can,\" knowing that taking the medications could pose a risk to the unborn baby. She is smoking some marijuana and some tobacco cigarettes because she says that she would not be able to cope mentally at all if she could not do either of those things. She is trying to keep the smoking to a minimum, though. She uses Proventil aerosols and a Ventolin HFA inhaler as needed for her asthma, which has been stable. Otherwise she seems to be doing fairly well and denies any other complaints. She is still smoking about a 1/2 pack of cigarettes a day, but she is trying to cut back because of the pregnancy.       ALLERGIES/INTOLERANCES:   Allergies   Allergen Reactions    Codeine Anaphylaxis     Happened when in ER getting arm reset at age 7  States did have to be tubed    Tape [Adhesive Tape] Other (See Comments)     Paper tape causes blisters       MEDICATIONS:   Outpatient Medications Marked as Taking for the 6/20/24 encounter (Telemedicine) with Wilmar Yuan, DO   Medication Sig Dispense Refill    aspirin 81 MG EC tablet Take 1 tablet by mouth daily      vitamin C (ASCORBIC ACID) 500 MG tablet Take 1 tablet by mouth daily      albuterol sulfate HFA (VENTOLIN HFA) 108 (90 Base) MCG/ACT inhaler inhale 1-2 puffs by mouth and INTO THE LUNGS every 6 hours if needed 18 g 11    ferrous sulfate (IRON 325) 325 (65 Fe) MG tablet Take 1 tablet by mouth

## 2025-01-09 DIAGNOSIS — F41.0 PANIC DISORDER: ICD-10-CM

## 2025-01-09 DIAGNOSIS — J40 BRONCHITIS: ICD-10-CM

## 2025-01-09 RX ORDER — ALBUTEROL SULFATE 0.83 MG/ML
2.5 SOLUTION RESPIRATORY (INHALATION) EVERY 6 HOURS PRN
Qty: 120 EACH | Refills: 3 | Status: SHIPPED | OUTPATIENT
Start: 2025-01-09

## 2025-01-09 RX ORDER — ALPRAZOLAM 1 MG/1
TABLET ORAL
Qty: 90 TABLET | Refills: 0 | Status: SHIPPED | OUTPATIENT
Start: 2025-01-09 | End: 2025-02-08

## 2025-01-09 NOTE — TELEPHONE ENCOUNTER
Jennifer called requesting a refill of the below medication which has been pended for you:     Requested Prescriptions     Pending Prescriptions Disp Refills    albuterol (PROVENTIL) (2.5 MG/3ML) 0.083% nebulizer solution 120 each 3     Sig: Take 3 mLs by nebulization every 6 hours as needed for Wheezing or Shortness of Breath    ALPRAZolam (XANAX) 1 MG tablet 90 tablet 0     Sig: take 1 tablet by mouth three times a day if needed       Last Appointment Date: 6/20/2024  Next Appointment Date: Visit date not found    Allergies   Allergen Reactions    Codeine Anaphylaxis     Happened when in ER getting arm reset at age 7  States did have to be tubed    Tape [Adhesive Tape] Other (See Comments)     Paper tape causes blisters

## 2025-01-09 NOTE — TELEPHONE ENCOUNTER
[place] checked today    Controlled Substance Monitoring:    Acute and Chronic Pain Monitoring:   RX Monitoring Periodic Controlled Substance Monitoring   1/9/2025  12:48 PM No signs of potential drug abuse or diversion identified.

## 2025-02-22 DIAGNOSIS — F41.0 PANIC DISORDER: ICD-10-CM

## 2025-02-24 RX ORDER — ALPRAZOLAM 1 MG/1
TABLET ORAL
Qty: 90 TABLET | Refills: 0 | Status: SHIPPED | OUTPATIENT
Start: 2025-02-24 | End: 2025-03-24

## 2025-02-24 NOTE — TELEPHONE ENCOUNTER
Pharmacy requesting a refill of the below medication which has been pended for you:     Requested Prescriptions     Pending Prescriptions Disp Refills    ALPRAZolam (XANAX) 1 MG tablet [Pharmacy Med Name: ALPRAZOLAM 1MG TABLETS] 90 tablet 0     Sig: TAKE 1 TABLET BY MOUTH THREE TIMES DAILY AS NEEDED       Last Appointment Date: 6/20/2024  Next Appointment Date: Visit date not found    Allergies   Allergen Reactions    Codeine Anaphylaxis     Happened when in ER getting arm reset at age 7  States did have to be tubed    Tape [Adhesive Tape] Other (See Comments)     Paper tape causes blisters
